# Patient Record
Sex: FEMALE | Race: BLACK OR AFRICAN AMERICAN | Employment: UNEMPLOYED | URBAN - METROPOLITAN AREA
[De-identification: names, ages, dates, MRNs, and addresses within clinical notes are randomized per-mention and may not be internally consistent; named-entity substitution may affect disease eponyms.]

---

## 2020-12-22 ENCOUNTER — HOSPITAL ENCOUNTER (OUTPATIENT)
Dept: PHYSICAL THERAPY | Age: 60
Setting detail: THERAPIES SERIES
Discharge: HOME | End: 2020-12-22
Payer: MEDICARE

## 2020-12-22 PROCEDURE — 97140 MANUAL THERAPY 1/> REGIONS: CPT

## 2020-12-22 PROCEDURE — 97110 THERAPEUTIC EXERCISES: CPT

## 2020-12-22 PROCEDURE — 97161 PT EVAL LOW COMPLEX 20 MIN: CPT

## 2020-12-22 NOTE — PLAN OF CARE
[address] [facility] [address] [place] [address]  Phone: [phone]   Fax:     [phone]                                                       Physical Therapy Certification    Dear Referring Practitioner: Dr. Vee Lester,    We had the pleasure of evaluating the following patient for physical therapy services at [facility]. A summary of our findings can be found in the initial assessment below. This includes our plan of care. If you have any questions or concerns regarding these findings, please do not hesitate to contact me at the office phone number checked above. Thank you for the referral.       Physician Signature:_______________________________Date:__________________  By signing above (or electronic signature), therapists plan is approved by physician          Patient: Yessica Fernandez   : 1960   MRN: [id number]  Referring Physician: Referring Practitioner: Dr. Vee Lester      Evaluation Date: 2020      Medical Diagnosis Information:  Diagnosis: M25.561 (ICD-10-CM) - Right knee pain                                             Insurance information: PT Insurance Information: [place] advantage     Precautions/ Contra-indications: non  Latex Allergy:  [x]NO      []YES  Preferred Language for Healthcare:   [x]English       []other:    C-SSRS Triggered by Intake questionnaire (Past 2 wk assessment ):   [x] No, Questionnaire did not trigger screening.   [] Yes, Patient intake triggered C-SSRS Screening      [] C-SSRS Screening completed  [] PCP notified via Epic     SUBJECTIVE: Patient is a 65 y/o female with a long hx of right knee pain for a fewyears with an increase over the past few months. She c/o constant aching pain in her right knee which increases with steps, prolonged walking, and hills. She is unemployed at this time.   She has had a cortisone shot which did not help at all. Davey Hamming She wakes from pain. She hopes to return to working out. She says it gives on her on occasion. Relevant Medical History:Additional Pertinent Hx: none noted  Functional Outcome Measure: LEFS = 18    Pain Scale: 7/10  Easing factors: rest  Provocative factors: walking     Type: [x]Constant   []Intermittent  []Radiating []Localized []other:         Occupation/School: not working    Living Status/Prior Level of Function: Independent with ADLs and IADLs,     OBJECTIVE:     Posture: ant tilt pelvis, poor ns      Palpation: - tight and tender in ant tib, post tib, gastroc, add, semi tendinosis, pes, decreased patellar mob chris med and inf        Gait: - ambualtes with decreased heel strike stance, and push. Mild limp. ROM LEFT RIGHT   HIP Flex  90   HIP Abd  25   HIP Ext  5   HIP IR  30   HIP ER  20   Knee ext  -10   Knee Flex  110   Ankle PF  25   Ankle DF  5   Ankle In     Ankle Ev     Strength  LEFT RIGHT   HIP Flexors  4   HIP Abductors  4   HIP Ext  4   Hip ER  4   Knee EXT (quad)  4-   Knee Flex (HS)  4-   Ankle DF  4   Ankle PF     Ankle Inv     Ankle EV          Circumference  Mid apex  7 cm prox      40       Reflexes/Sensation:    [x]Dermatomes/Myotomes intact    [x]Reflexes equal and normal bilaterally   []Other:    Joint mobility:    []Normal    [x]Hypo   []Hyper    Orthopedic Special Tests: - Mcmurrays and Apleys neg, patellar compression painful. Ant and post drawer- neg, Collateral- med laxity noted. [x] Patient history, allergies, meds reviewed. Medical chart reviewed. See intake form. Review Of Systems (ROS):  [x]Performed Review of systems (Integumentary, CardioPulmonary, Neurological) by intake and observation. Intake form has been scanned into medical record. Patient has been instructed to contact their primary care physician regarding ROS issues if not already being addressed at this time.       Co-morbidities/Complexities (which will affect course of rehabilitation):   []None           Arthritic conditions   []Rheumatoid arthritis (M05.9)  [x]Osteoarthritis (M19.91)   Cardiovascular conditions   []Hypertension (I10)  []Hyperlipidemia (E78.5)  []Angina pectoris (I20)  []Atherosclerosis (I70)  []CVA Musculoskeletal conditions   []Disc pathology   []Congenital spine pathologies   []Prior surgical intervention  []Osteoporosis (M81.8)  []Osteopenia (M85.8)   Endocrine conditions   []Hypothyroid (E03.9)  []Hyperthyroid Gastrointestinal conditions   []Constipation (K05.35)   Metabolic conditions   []Morbid obesity (E66.01)  []Diabetes type 1(E10.65) or 2 (E11.65)   []Neuropathy (G60.9)     Pulmonary conditions   []Asthma (J45)  []Coughing   []COPD (J44.9)   Psychological Disorders  []Anxiety (F41.9)  []Depression (F32.9)   []Other:   [x]Other:   fibromyalgia       Barriers to/and or personal factors that will affect rehab potential:              []Age  []Sex    [x]Smoker              []Motivation/Lack of Motivation                        [x]Co-Morbidities              []Cognitive Function, education/learning barriers              []Environmental, home barriers              []profession/work barriers  []past PT/medical experience  []other:  Justification:     Falls Risk Assessment (30 days):  [x] Falls Risk assessed and no intervention required.   [] Falls Risk assessed and Patient requires intervention due to being higher risk   TUG score (>12s at risk):     [] Falls education provided, including         ASSESSMENT:   Functional Impairments:     []Noted lumbar/proximal hip/LE hypomobility   [x]Decreased LE functional ROM   [x]Decreased core/proximal hip strength and neuromuscular control   [x]Decreased LE functional strength   [x]Reduced balance/proprioceptive control   []other:      Functional Activity Limitations (from functional questionnaire and intake)   []Reduced ability to tolerate prolonged functional positions   []Reduced ability or difficulty with changes of positions or transfers between positions   []Reduced ability to maintain good posture and demonstrate good body mechanics with sitting, bending, and lifting   [x]Reduced ability to sleep   [] Reduced ability or tolerance with driving and/or computer work   [x]Reduced ability to perform lifting, carrying tasks   [x]Reduced ability to squat   []Reduced ability to forward bend   [x]Reduced ability to ambulate prolonged functional periods/distances/surfaces   [x]Reduced ability to ascend/descend stairs   [x]Reduced ability to run, hop or jump   []other:     Participation Restrictions   [x]Reduced participation in self care activities   [x]Reduced participation in home management activities   [x]Reduced participation in work activities   [x]Reduced participation in social activities. [x]Reduced participation in sport activities. Classification :    [x]Signs/symptoms consistent with post-surgical status including decreased ROM, strength and function.    []Signs/symptoms consistent with joint sprain/strain   [x]Signs/symptoms consistent with patella-femoral syndrome   []Signs/symptoms consistent with knee OA/hip OA   [x]Signs/symptoms consistent with internal derangement of knee/Hip   [x]Signs/symptoms consistent with functional hip weakness/NMR control      [x]Signs/symptoms consistent with tendinitis/tendinosis    [x]signs/symptoms consistent with pathology which may benefit from Dry needling      []other:      Prognosis/Rehab Potential:      []Excellent   [x]Good    [x]Fair   []Poor    Tolerance of evaluation/treatment:    []Excellent   [x]Good    [x]Fair   []Poor    Physical Therapy Evaluation Complexity Justification  [x] A history of present problem with:  [] no personal factors and/or comorbidities that impact the plan of care;  []1-2 personal factors and/or comorbidities that impact the plan of care  []3 personal factors and/or comorbidities that impact the plan of care  [x] An examination of Functional Deficits. [] Progressing: [] Met: [] Not Met: [] Adjusted    Long Term Goals: To be achieved in:6weeks    [] Progressing: [] Met: [] Not Met: [] Adjusted  2. Patient will demonstrate increased AROM to 120-0 to allow for proper joint functioning as indicated by patients Functional Deficits. [] Progressing: [] Met: [] Not Met: [] Adjusted  3. Patient will demonstrate an increase in Strength to good proximal hip strength and control, within 5lb HHD in LE to allow for proper functional mobility as indicated by patients Functional Deficits. [] Progressing: [] Met: [] Not Met: [] Adjusted  4. Patient will return to 75%functional activities without increased symptoms or restriction. [] Progressing: [] Met: [] Not Met: [] Adjusted  5. (patient specific functional goal)    [] Progressing: [] Met: [] Not Met: [] Adjusted     Electronically signed by:  Janette Sharma PT      Note: If patient does not return for scheduled/recommended follow up visits, this note will serve as a discharge from care along with the most recent update on progress.

## 2020-12-22 NOTE — FLOWSHEET NOTE
[facility]  [address] [place]  Phone: [phone]   Fax:     [phone]      Physical Therapy Treatment Note/ Progress Report:     Date:  2020    Patient Name:  Hyacinth Valdovinos    :  1960  MRN: [id number]    Pertinent Medical History:Additional Pertinent Hx: none noted    Medical/Treatment Diagnosis Information:  · Diagnosis: M25.561 (ICD-10-CM) - Right knee pain  ·  decreased abilty to ambulate and function    Insurance/Certification information:  PT Insurance Information: [place] advantage  Physician Information:  Referring Practitioner: Dr. Edilia Madera of care signed (Y/N): faxed    Date of Patient follow up with Physician:      Progress Report: []  Yes  [x]  No     Date Range for reporting period:  Beginnin2020  Ending:      Progress report due (10 Rx/or 30 days whichever is less):     Recertification due (POC duration/ or 90 days whichever is less): 3/22/21     Visit # POC/Insurance Allowable Auth Needed   1 20 []Yes   []No     Latex Allergy:  [x]NO      []YES  Preferred Language for Healthcare:   [x]English       []Other:    Functional Scale:      Date assessed: at eval  Test: LEFS-18  Score:    Pain level:4-7/10     History of Injury:Patient is a 65 y/o female with a long hx of right knee pain for a fewyears with an increase over the past few months. She c/o constant aching pain in her right knee which increases with steps, prolonged walking, and hills. She is unemployed at this time. She has had a cortisone shot which did not help at all. Ronald Leahy She wakes from pain. She hopes to return to working out.   She says it gives on her on occasion    SUBJECTIVE:  Pt states, \" I want to walk and function like normal \"    OBJECTIVE:     ROM LEFT RIGHT   HIP Flex   90   HIP Abd   25   HIP Ext   5   HIP IR   30   HIP ER   20   Knee ext   -10   Knee Flex   110 Ankle PF   25   Ankle DF   5   Ankle In       Ankle Ev       Strength  LEFT RIGHT   HIP Flexors   4   HIP Abductors   4   HIP Ext   4   Hip ER   4   Knee EXT (quad)   4-   Knee Flex (HS)   4-   Ankle DF   4   Ankle PF       Ankle Inv       Ankle EV               Circumference  Mid apex  7 cm prox         40             RESTRICTIONS/PRECAUTIONS:     Exercises/Interventions:     Therapeutic Ex (76504)   Min: Reps/Resistance Notes/CUES   Nu step     Leg press     saq/laq     Sl abd     Prone le ext     incline     Med ahms stretch                              Manual Intervention (23368)  Min:     Knee mobs/PROM     Tib/Fem Mobs     Patella Mobs     Ankle mobs               NMR re-education (91313)  Min:  CUES NEEDED   wobble     sls                         Therapeutic Activity (67580)  Min:               Modalities  Min:     IFC with      CP after exercises     MH after exercises                                        HEP     gastroc stretch 60 x 2    Hams stretch 60 x 2    qs X 20    Prone flex X 30    slr X 30           Other Therapeutic Activities: Pt was educated on PT POC, Diagnosis, Prognosis, pathomechanics as well as frequency and duration of scheduling future physical therapy appointments. Time was also taken on this day to answer all patient questions and participation in PT. Reviewed appointment policy in detail with patient and patient verbalized understanding. Home Exercise Program: Patient was instructed in the following for HEP:     . Patient verbalized/demonstrated understanding and was issued written handout.   12/22/20  HEP  -clams x 30,       Therapeutic Exercise and NMR EXR  [] (97642) Provided verbal/tactile cueing for activities related to strengthening, flexibility, endurance, ROM for improvements in LE, proximal hip, and core control with self care, mobility, lifting, ambulation.  [] (78519) Provided verbal/tactile cueing for activities related to improving balance, coordination, kinesthetic sense, posture, motor skill, proprioception  to assist with LE, proximal hip, and core control in self care, mobility, lifting, ambulation and eccentric single leg control. NMR and Therapeutic Activities:    [] (61095 or 42244) Provided verbal/tactile cueing for activities related to improving balance, coordination, kinesthetic sense, posture, motor skill, proprioception and motor activation to allow for proper function of core, proximal hip and LE with self care and ADLs and functional mobility.   [] (43532) Gait Re-education- Provided training and instruction to the patient for proper LE, core and proximal hip recruitment and positioning and eccentric body weight control with ambulation re-education including up and down stairs     Home Exercise Program:    [x] (57086) Reviewed/Progressed HEP activities related to strengthening, flexibility, endurance, ROM of core, proximal hip and LE for functional self-care, mobility, lifting and ambulation/stair navigation   [] (70293)Reviewed/Progressed HEP activities related to improving balance, coordination, kinesthetic sense, posture, motor skill, proprioception of core, proximal hip and LE for self care, mobility, lifting, and ambulation/stair navigation      Manual Treatments:  PROM / STM / Oscillations-Mobs:  G-I, II, III, IV (PA's, Inf., Post.)  [] (37958) Provided manual therapy to mobilize LE, proximal hip and/or LS spine soft tissue/joints for the purpose of modulating pain, promoting relaxation,  increasing ROM, reducing/eliminating soft tissue swelling/inflammation/restriction, improving soft tissue extensibility and allowing for proper ROM for normal function with self care, mobility, lifting and ambulation.      If [facility] Please Indicate Time In/Out  CPT Code Time in Time out                         Charges:  Timed Code Treatment Minutes: 30   Total Treatment Minutes: 45      [x] EVAL (LOW) 06935 (typically 20 minutes face-to-face)  [] EVAL (MOD) 03378 (typically 30 minutes face-to-face)  [] EVAL (HIGH) 94904 (typically 45 minutes face-to-face)  [] RE-EVAL     [x] DP(22777) x   1  [] Dry needle 1 or 2 Muscles (04349)  [] NMR (75736) x     [] Dry needle 3+ Muscles (36573)  [x] Manual (13119) x 1    [] Ultrasound (27526) x  [] TA (44016) x     [] Mech Traction (71774)  [] ES(attended) (23196)     [] ES (un) (05968):   [] Vasopump (00214) [] Ionto (92388)   [] Other:    María Elena Cruz stated goal: \" I want to be able to walk and function like normal \"  []? Progressing: []? Met: []? Not Met: []? Adjusted     Therapist goals for Patient:   Short Term Goals: To be achieved in: 2 weeks  1. Independent in HEP and progression per patient tolerance, in order to prevent re-injury. []? Progressing: []? Met: []? Not Met: []? Adjusted  2. Patient will have a decrease in pain to facilitate improvement in movement, function, and ADLs as indicated by Functional Deficits. []? Progressing: []? Met: []? Not Met: []? Adjusted     Long Term Goals: To be achieved in:6weeks     []? Progressing: []? Met: []? Not Met: []? Adjusted  2. Patient will demonstrate increased AROM to 120-0 to allow for proper joint functioning as indicated by patients Functional Deficits. []? Progressing: []? Met: []? Not Met: []? Adjusted  3. Patient will demonstrate an increase in Strength to good proximal hip strength and control, within 5lb HHD in LE to allow for proper functional mobility as indicated by patients Functional Deficits. []? Progressing: []? Met: []? Not Met: []? Adjusted  4. Patient will return to 75%functional activities without increased symptoms or restriction. []? Progressing: []? Met: []? Not Met: []? Adjusted  5. (patient specific functional goal)    []? Progressing: []? Met: []? Not Met: []?  Adjusted     S:    ASSESSMENT:  See eval    Treatment/Activity Tolerance:  [x] Patient tolerated treatment well [] Patient limited by fatique  [] Patient limited by pain  [] Patient limited by other medical complications  [] Other:     Overall Progression Towards Functional goals/ Treatment Progress Update:  [] Patient is progressing as expected towards functional goals listed. [] Progression is slowed due to complexities/Impairments listed. [] Progression has been slowed due to co-morbidities. [x] Plan just implemented, too soon to assess goals progression <30days   [] Goals require adjustment due to lack of progress  [] Patient is not progressing as expected and requires additional follow up with physician  [] Other    Prognosis for POC: [x] Good [] Fair  [] Poor    Patient requires continued skilled intervention: [x] Yes  [] No        PLAN: LE arom, prom  strength, proprioception, gait, balance, functional activities. Mfr, joint mobs, mods as needed, hep. Progress as tolerated    [] Continue per plan of care [] Alter current plan (see comments)  [x] Plan of care initiated [] Hold pending MD visit [] Discharge    Electronically signed by: Alena Zaidi PT    Note: If patient does not return for scheduled/recommended follow up visits, this note will serve as a discharge from care along with the most recent update on progress.

## 2020-12-29 ENCOUNTER — HOSPITAL ENCOUNTER (OUTPATIENT)
Dept: PHYSICAL THERAPY | Age: 60
Setting detail: THERAPIES SERIES
Discharge: HOME | End: 2020-12-29
Payer: MEDICARE

## 2020-12-29 PROCEDURE — 97530 THERAPEUTIC ACTIVITIES: CPT

## 2020-12-29 PROCEDURE — 97110 THERAPEUTIC EXERCISES: CPT

## 2020-12-29 NOTE — FLOWSHEET NOTE
HIP Flexors   4   HIP Abductors   4   HIP Ext   4   Hip ER   4   Knee EXT (quad)   4-   Knee Flex (HS)   4-   Ankle DF   4   Ankle PF       Ankle Inv       Ankle EV               Circumference  Mid apex  7 cm prox         40             RESTRICTIONS/PRECAUTIONS:     Exercises/Interventions:     Therapeutic Ex (38965)   Min: Reps/Resistance Notes/CUES   Nu step X 5 min , level 3    Leg press 75# 2 x 20  45# 2 x 20 R    Saq/laq LAQ 2 x 20 7#    Sl abd     Prone le ext     incline 3 x 30\" R    Med ahms stretch 3 x 30\" R     SLR 2 x 20 R                        Manual Intervention (24320)  Min:     Knee mobs/PROM     Tib/Fem Mobs     Patella Mobs     Ankle mobs     K Tape Runner's knee to Right         NMR re-education (40143)  Min:  CUES NEEDED   wobble     sls                         Therapeutic Activity (78974)  Min:               Modalities  Min:     IFC with      CP after exercises     MH after exercises                                        HEP     gastroc stretch 60 x 2    Hams stretch 60 x 2    qs X 20    Prone flex X 30    slr X 30           Other Therapeutic Activities:   Pt was educated on PT POC, Diagnosis, Prognosis, pathomechanics as well as frequency and duration of scheduling future physical therapy appointments. Time was also taken on this day to answer all patient questions and participation in PT. Reviewed appointment policy in detail with patient and patient verbalized understanding. Home Exercise Program:   Patient was instructed in the following for HEP:     . Patient verbalized/demonstrated understanding and was issued written handout.   12/22/20  HEP  -clams x 30,       Therapeutic Exercise and NMR EXR  [] (87645) Provided verbal/tactile cueing for activities related to strengthening, flexibility, endurance, ROM for improvements in LE, proximal hip, and core control with self care, mobility, lifting, ambulation.  [] (08372) Provided verbal/tactile cueing for activities related to improving face-to-face)  [] EVAL (MOD) 42933 (typically 30 minutes face-to-face)  [] EVAL (HIGH) 14027 (typically 45 minutes face-to-face)  [] RE-EVAL     [x] JY(29242) x   2  [] Dry needle 1 or 2 Muscles (90285)  [] NMR (74926) x     [] Dry needle 3+ Muscles (97392)  [] Manual (65282) x 1    [] Ultrasound (42154) x  [x] TA (97600) x     [] Mech Traction (10935)  [] ES(attended) (65000)     [] ES (un) (45278):   [] Vasopump (14181) [] Ionto (01452)   [] Other:    Ene Paniagua stated goal: \" I want to be able to walk and function like normal \"  []? Progressing: []? Met: []? Not Met: []? Adjusted     Therapist goals for Patient:   Short Term Goals: To be achieved in: 2 weeks  1. Independent in HEP and progression per patient tolerance, in order to prevent re-injury. []? Progressing: []? Met: []? Not Met: []? Adjusted  2. Patient will have a decrease in pain to facilitate improvement in movement, function, and ADLs as indicated by Functional Deficits. []? Progressing: []? Met: []? Not Met: []? Adjusted     Long Term Goals: To be achieved in:6weeks     []? Progressing: []? Met: []? Not Met: []? Adjusted  2. Patient will demonstrate increased AROM to 120-0 to allow for proper joint functioning as indicated by patients Functional Deficits. []? Progressing: []? Met: []? Not Met: []? Adjusted  3. Patient will demonstrate an increase in Strength to good proximal hip strength and control, within 5lb HHD in LE to allow for proper functional mobility as indicated by patients Functional Deficits. []? Progressing: []? Met: []? Not Met: []? Adjusted  4. Patient will return to 75%functional activities without increased symptoms or restriction. []? Progressing: []? Met: []? Not Met: []? Adjusted  5. (patient specific functional goal)    []? Progressing: []? Met: []? Not Met: []?  Adjusted     S:    ASSESSMENT:  See eval    Treatment/Activity Tolerance:  [x] Patient tolerated treatment well [] Patient limited by omar  [] Patient limited by pain  [] Patient limited by other medical complications  [] Other:     Overall Progression Towards Functional goals/ Treatment Progress Update:  [] Patient is progressing as expected towards functional goals listed. [] Progression is slowed due to complexities/Impairments listed. [] Progression has been slowed due to co-morbidities. [x] Plan just implemented, too soon to assess goals progression <30days   [] Goals require adjustment due to lack of progress  [] Patient is not progressing as expected and requires additional follow up with physician  [] Other    Prognosis for POC: [x] Good [] Fair  [] Poor    Patient requires continued skilled intervention: [x] Yes  [] No        PLAN: LE arom, prom  strength, proprioception, gait, balance, functional activities. Mfr, joint mobs, mods as needed, hep. Progress as tolerated    [] Continue per plan of care [] Alter current plan (see comments)  [x] Plan of care initiated [] Hold pending MD visit [] Discharge    Electronically signed by: Aixa Barajas, PT    Note: If patient does not return for scheduled/recommended follow up visits, this note will serve as a discharge from care along with the most recent update on progress.

## 2020-12-31 ENCOUNTER — HOSPITAL ENCOUNTER (OUTPATIENT)
Dept: PHYSICAL THERAPY | Age: 60
Setting detail: THERAPIES SERIES
Discharge: HOME | End: 2020-12-31
Payer: MEDICARE

## 2020-12-31 PROCEDURE — 97140 MANUAL THERAPY 1/> REGIONS: CPT

## 2020-12-31 PROCEDURE — 97110 THERAPEUTIC EXERCISES: CPT

## 2020-12-31 NOTE — FLOWSHEET NOTE
[facility] - Outpatient Rehabilitation & Therapy  [address] [address] ([facility]. [place] [address]  Phone: [phone]   Fax:     [phone]      Physical Therapy Treatment Note/ Progress Report:     Date:  2020    Patient Name:  Arun Negrete    :  1960  MRN: [id number]    Pertinent Medical History:     Medical/Treatment Diagnosis Information:     ·  decreased abilty to ambulate and function    Insurance/Certification information:     Physician Information:     Plan of care signed (Y/N): faxed    Date of Patient follow up with Physician:      Progress Report: []  Yes  [x]  No     Date Range for reporting period:  Beginnin2020  Ending:      Progress report due (10 Rx/or 30 days whichever is less):     Recertification due (POC duration/ or 90 days whichever is less): 3/22/21     Visit # POC/Insurance Allowable Auth Needed   3 20 []Yes   []No     Latex Allergy:  [x]NO      []YES  Preferred Language for Healthcare:   [x]English       []Other:    Functional Scale:      Date assessed: at eval  Test: LEFS-18  Score:    Pain level: 5/10     History of Injury:Patient is a 65 y/o female with a long hx of right knee pain for a fewyears with an increase over the past few months. She c/o constant aching pain in her right knee which increases with steps, prolonged walking, and hills. She is unemployed at this time. She has had a cortisone shot which did not help at all. Meagan Bob She wakes from pain. She hopes to return to working out. She says it gives on her on occasion    SUBJECTIVE:    Pt states, \" I want to walk and function like normal \"  20: States today she is hurting more, yesterday was a better day 5/10 pain  20: Patient reports knee is not too bad today,just some swelling around the knee.     OBJECTIVE:     ROM LEFT RIGHT   HIP Flex   90   HIP Abd   25   HIP Ext   5   HIP IR   30   HIP ER   20   Knee ext   -10   Knee Flex   110   Ankle PF   25   Ankle DF   5   Ankle In       Ankle Ev       Strength  LEFT RIGHT   HIP Flexors   4   HIP Abductors   4   HIP Ext   4   Hip ER   4   Knee EXT (quad)   4-   Knee Flex (HS)   4-   Ankle DF   4   Ankle PF       Ankle Inv       Ankle EV               Circumference  Mid apex  7 cm prox         40             RESTRICTIONS/PRECAUTIONS:     Exercises/Interventions:     Therapeutic Ex (56548)   Min: Reps/Resistance Notes/CUES   Nu step X 5 min , level 3    Leg press 75# 2 x 20  45# 2 x 20 R    Saq/laq LAQ 2 x 20 7#    Sl abd     Prone le ext     incline 3 x 30\" R    Med ahms stretch 3 x 30\" R     SLR 2 x 20 R                        Manual Intervention (82148)  Min:     Knee mobs/PROM     Tib/Fem Mobs     Patella Mobs 5 min    Ankle mobs     K Tape Runner's knee to Right    STM Quad,TFL,gastroc x 10 min    NMR re-education (52546)  Min:  CUES NEEDED   wobble     sls                         Therapeutic Activity (69312)  Min:               Modalities  Min:     IFC with      CP after exercises     MH after exercises                                        HEP     gastroc stretch 60 x 2    Hams stretch 60 x 2    qs X 20    Prone flex X 30    slr X 30           Other Therapeutic Activities:   Pt was educated on PT POC, Diagnosis, Prognosis, pathomechanics as well as frequency and duration of scheduling future physical therapy appointments. Time was also taken on this day to answer all patient questions and participation in PT. Reviewed appointment policy in detail with patient and patient verbalized understanding. Home Exercise Program:   Patient was instructed in the following for HEP:     . Patient verbalized/demonstrated understanding and was issued written handout.   12/22/20  HEP  -clams x 30,       Therapeutic Exercise and NMR EXR  [] (29233) Provided verbal/tactile cueing for activities related to strengthening, flexibility, endurance, ROM for improvements in LE, proximal hip, and core control with self care, mobility, lifting, ambulation.  [] (51907) Provided verbal/tactile cueing for activities related to improving balance, coordination, kinesthetic sense, posture, motor skill, proprioception  to assist with LE, proximal hip, and core control in self care, mobility, lifting, ambulation and eccentric single leg control. NMR and Therapeutic Activities:    [] (09058 or 39590) Provided verbal/tactile cueing for activities related to improving balance, coordination, kinesthetic sense, posture, motor skill, proprioception and motor activation to allow for proper function of core, proximal hip and LE with self care and ADLs and functional mobility.   [] (34824) Gait Re-education- Provided training and instruction to the patient for proper LE, core and proximal hip recruitment and positioning and eccentric body weight control with ambulation re-education including up and down stairs     Home Exercise Program:    [x] (91315) Reviewed/Progressed HEP activities related to strengthening, flexibility, endurance, ROM of core, proximal hip and LE for functional self-care, mobility, lifting and ambulation/stair navigation   [] (05644)Reviewed/Progressed HEP activities related to improving balance, coordination, kinesthetic sense, posture, motor skill, proprioception of core, proximal hip and LE for self care, mobility, lifting, and ambulation/stair navigation      Manual Treatments:  PROM / STM / Oscillations-Mobs:  G-I, II, III, IV (PA's, Inf., Post.)  [] (28873) Provided manual therapy to mobilize LE, proximal hip and/or LS spine soft tissue/joints for the purpose of modulating pain, promoting relaxation,  increasing ROM, reducing/eliminating soft tissue swelling/inflammation/restriction, improving soft tissue extensibility and allowing for proper ROM for normal function with self care, mobility, lifting and ambulation.      If BWC Please Indicate Time In/Out  CPT Code Time in Time out See eval    Treatment/Activity Tolerance:  [x] Patient tolerated treatment well [] Patient limited by fatique  [] Patient limited by pain  [] Patient limited by other medical complications  [] Other:     Overall Progression Towards Functional goals/ Treatment Progress Update:  [] Patient is progressing as expected towards functional goals listed. [] Progression is slowed due to complexities/Impairments listed. [] Progression has been slowed due to co-morbidities. [x] Plan just implemented, too soon to assess goals progression <30days   [] Goals require adjustment due to lack of progress  [] Patient is not progressing as expected and requires additional follow up with physician  [] Other    Prognosis for POC: [x] Good [] Fair  [] Poor    Patient requires continued skilled intervention: [x] Yes  [] No        PLAN: LE arom, prom  strength, proprioception, gait, balance, functional activities. Mfr, joint mobs, mods as needed, hep. Progress as tolerated    [] Continue per plan of care [] Alter current plan (see comments)  [x] Plan of care initiated [] Hold pending MD visit [] Discharge    Electronically signed by: Matthew Maldonado PTA    Note: If patient does not return for scheduled/recommended follow up visits, this note will serve as a discharge from care along with the most recent update on progress.

## 2021-01-05 ENCOUNTER — HOSPITAL ENCOUNTER (OUTPATIENT)
Dept: PHYSICAL THERAPY | Age: 61
Setting detail: THERAPIES SERIES
Discharge: HOME | End: 2021-01-05
Payer: MEDICARE

## 2021-01-05 PROCEDURE — 97140 MANUAL THERAPY 1/> REGIONS: CPT

## 2021-01-05 PROCEDURE — 97110 THERAPEUTIC EXERCISES: CPT

## 2021-01-05 NOTE — FLOWSHEET NOTE
[facility] - Outpatient Rehabilitation & Therapy  [address]. [place]  Phone: [phone]   Fax:     [phone]      Physical Therapy Treatment Note/ Progress Report:     Date:  2021    Patient Name:  Juan Rodgers    :  1960  MRN: [id number]  Patient Name:  Juan Rodgers                        :  1960                   MRN: [id number]     Pertinent Medical History:Additional Pertinent Hx: none noted     Medical/Treatment Diagnosis Information:  · Diagnosis: M25.561 (ICD-10-CM) - Right knee pain  ·  decreased abilty to ambulate and function     Insurance/Certification information:  PT Insurance Information: [employer]  Physician Information:  Referring Practitioner: Dr. Gus Hazel of care signed (Y/N): faxed     Date of Patient follow up with Physician:      Progress Report:      []? Yes                        [x]? No       Date of Patient follow up with Physician:      Progress Report: []  Yes  [x]  No     Date Range for reporting period:  Beginnin2021  Ending:      Progress report due (10 Rx/or 30 days whichever is less):     Recertification due (POC duration/ or 90 days whichever is less): 3/22/21     Visit # POC/Insurance Allowable Auth Needed   4 20 []Yes   []No     Latex Allergy:  [x]NO      []YES  Preferred Language for Healthcare:   [x]English       []Other:    Functional Scale:      Date assessed: at eval  Test: LEFS-18  Score:    Pain level: 5/10     History of Injury:Patient is a 65 y/o female with a long hx of right knee pain for a fewyears with an increase over the past few months. She c/o constant aching pain in her right knee which increases with steps, prolonged walking, and hills. She is unemployed at this time. She has had a cortisone shot which did not help at all. Belinda Henderson She wakes from pain. She hopes to return to working out.   She says it gives on her on occasion    SUBJECTIVE:    Pt states, \" I want to walk and function like normal \"  12/29/20: States today she is hurting more, yesterday was a better day 5/10 pain  12/31/20: Patient reports knee is not too bad today,just some swelling around the knee. 1/5/20  Pt states, \" MRI says that I have a tear in my medial meniscus \" \" I see the md in 2 weeks to discuss surgery \"    OBJECTIVE:     ROM LEFT RIGHT   HIP Flex   90   HIP Abd   25   HIP Ext   5   HIP IR   30   HIP ER   20   Knee ext   -10   Knee Flex   110   Ankle PF   25   Ankle DF   5   Ankle In       Ankle Ev       Strength  LEFT RIGHT   HIP Flexors   4   HIP Abductors   4   HIP Ext   4   Hip ER   4   Knee EXT (quad)   4-   Knee Flex (HS)   4-   Ankle DF   4   Ankle PF       Ankle Inv       Ankle EV               Circumference  Mid apex  7 cm prox         40             RESTRICTIONS/PRECAUTIONS:     Exercises/Interventions:     Therapeutic Ex (16191)   Min: Reps/Resistance Notes/CUES   Nu step X 5 min , level 3    Leg press 75# 2 x 20  45# 2 x 20 R    Saq/laq     Sl abd X 30    Prone curls 3# x 30    Prone le ext X 30    incline 3 x 30\" R    Med hams stretch 3 x 30\" R     SLR 2 x 20 R    sls X 2 min                   Manual Intervention (06565)  Min:     Knee mobs/PROM     Tib/Fem Mobs     Patella Mobs 5 min    Ankle mobs     K Tape Runner's knee to Right    STM Quad,TFL,gastroc x 10 min    NMR re-education (23200)  Min:  CUES NEEDED   wobble     sls                         Therapeutic Activity (68243)  Min:               Modalities  Min:     IFC with      CP after exercises     MH after exercises                                        HEP     gastroc stretch 60 x 2    Hams stretch 60 x 2    qs X 20    Prone flex X 30    slr X 30           Other Therapeutic Activities:   Pt was educated on PT POC, Diagnosis, Prognosis, pathomechanics as well as frequency and duration of scheduling future physical therapy appointments.  Time was also taken on this day to answer STM / Oscillations-Mobs:  G-I, II, III, IV (PA's, Inf., Post.)  [] (32787) Provided manual therapy to mobilize LE, proximal hip and/or LS spine soft tissue/joints for the purpose of modulating pain, promoting relaxation,  increasing ROM, reducing/eliminating soft tissue swelling/inflammation/restriction, improving soft tissue extensibility and allowing for proper ROM for normal function with self care, mobility, lifting and ambulation. If BWC Please Indicate Time In/Out  CPT Code Time in Time out                         Charges:  Timed Code Treatment Minutes: 45   Total Treatment Minutes: 45      [] EVAL (LOW) 57614 (typically 20 minutes face-to-face)  [] EVAL (MOD) 50729 (typically 30 minutes face-to-face)  [] EVAL (HIGH) 23742 (typically 45 minutes face-to-face)  [] RE-EVAL     [x] RU(39238) x   2  [] Dry needle 1 or 2 Muscles (49807)  [] NMR (46437) x     [] Dry needle 3+ Muscles (93608)  [x] Manual (55011) x 1    [] Ultrasound (17520) x  [] TA (50011) x     [] Mech Traction (87874)  [] ES(attended) (27894)     [] ES (un) (02068):   [] Vasopump (05911) [] Ionto (28493)   [] Other:    Paul Delarosa stated goal: \" I want to be able to walk and function like normal \"  []? Progressing: []? Met: []? Not Met: []? Adjusted     Therapist goals for Patient:   Short Term Goals: To be achieved in: 2 weeks  1. Independent in HEP and progression per patient tolerance, in order to prevent re-injury. []? Progressing: []? Met: []? Not Met: []? Adjusted  2. Patient will have a decrease in pain to facilitate improvement in movement, function, and ADLs as indicated by Functional Deficits. []? Progressing: []? Met: []? Not Met: []? Adjusted     Long Term Goals: To be achieved in:6weeks     []? Progressing: []? Met: []? Not Met: []? Adjusted  2. Patient will demonstrate increased AROM to 120-0 to allow for proper joint functioning as indicated by patients Functional Deficits. []? Progressing: []? Met: []?  Not Met: []? Adjusted  3. Patient will demonstrate an increase in Strength to good proximal hip strength and control, within 5lb HHD in LE to allow for proper functional mobility as indicated by patients Functional Deficits. []? Progressing: []? Met: []? Not Met: []? Adjusted  4. Patient will return to 75%functional activities without increased symptoms or restriction. []? Progressing: []? Met: []? Not Met: []? Adjusted  5. (patient specific functional goal)    []? Progressing: []? Met: []? Not Met: []? Adjusted     S:    ASSESSMENT:  See eval    Treatment/Activity Tolerance:  [x] Patient tolerated treatment well [] Patient limited by fatique  [] Patient limited by pain  [] Patient limited by other medical complications  [] Other:     Overall Progression Towards Functional goals/ Treatment Progress Update:  [] Patient is progressing as expected towards functional goals listed. [] Progression is slowed due to complexities/Impairments listed. [] Progression has been slowed due to co-morbidities. [x] Plan just implemented, too soon to assess goals progression <30days   [] Goals require adjustment due to lack of progress  [] Patient is not progressing as expected and requires additional follow up with physician  [] Other    Prognosis for POC: [x] Good [] Fair  [] Poor    Patient requires continued skilled intervention: [x] Yes  [] No        PLAN: LE arom, prom  strength, proprioception, gait, balance, functional activities. Mfr, joint mobs, mods as needed, hep. Progress as tolerated    [] Continue per plan of care [] Alter current plan (see comments)  [x] Plan of care initiated [] Hold pending MD visit [] Discharge    Electronically signed by: Brent Kwong PT    Note: If patient does not return for scheduled/recommended follow up visits, this note will serve as a discharge from care along with the most recent update on progress.

## 2021-01-07 ENCOUNTER — HOSPITAL ENCOUNTER (OUTPATIENT)
Dept: PHYSICAL THERAPY | Age: 61
Setting detail: THERAPIES SERIES
Discharge: HOME | End: 2021-01-07
Payer: MEDICARE

## 2021-01-07 NOTE — FLOWSHEET NOTE
Physical Therapy  Cancellation/No-show Note  Patient Name:  Makeda Burr  :  1960   Date:  2021  Cancelled visits to date: 1  No-shows to date: 0    For today's appointment patient:  [x]  Cancelled  []  Rescheduled appointment  []  No-show     Reason given by patient:  [x]  Patient ill  []  Conflicting appointment  []  No transportation    []  Conflict with work  []  No reason given  []  Other:     Comments:      Electronically signed by:  Velma Edward PTA

## 2021-01-12 ENCOUNTER — HOSPITAL ENCOUNTER (OUTPATIENT)
Dept: PHYSICAL THERAPY | Age: 61
Setting detail: THERAPIES SERIES
Discharge: HOME | End: 2021-01-12
Payer: MEDICARE

## 2021-01-12 NOTE — FLOWSHEET NOTE
Physical Therapy  Cancellation/No-show Note  Patient Name:  Yaquelin Louis  :  1960   Date:  2021  Cancelled visits to date: 2   Family death  No-shows to date: 0    For today's appointment patient:  [x]  Cancelled  []  Rescheduled appointment  []  No-show     Reason given by patient:  [x]  Patient ill  []  Conflicting appointment  []  No transportation    []  Conflict with work  []  No reason given  []  Other:     Comments:      Electronically signed by:  Jessica Bonilla PT

## 2021-01-14 ENCOUNTER — APPOINTMENT (OUTPATIENT)
Dept: PHYSICAL THERAPY | Age: 61
End: 2021-01-14
Payer: MEDICARE

## 2021-01-19 ENCOUNTER — HOSPITAL ENCOUNTER (OUTPATIENT)
Dept: PHYSICAL THERAPY | Age: 61
Setting detail: THERAPIES SERIES
Discharge: HOME | End: 2021-01-19
Payer: MEDICARE

## 2021-01-19 PROCEDURE — 97110 THERAPEUTIC EXERCISES: CPT

## 2021-01-19 PROCEDURE — 97140 MANUAL THERAPY 1/> REGIONS: CPT

## 2021-01-19 NOTE — FLOWSHEET NOTE
[facility] - Outpatient Rehabilitation & Therapy  [address] [facility]. [place] [address]  Phone: [phone]   Fax:     [phone]      Physical Therapy Treatment Note/ Progress Report:     Date:  2021    Patient Name:  Sheng Taveras    :  1960  MRN: [id number]  Patient Name:  Sheng Taveras                        :  1960                   MRN: [id number]     Pertinent Medical History:Additional Pertinent Hx: none noted     Medical/Treatment Diagnosis Information:  · Diagnosis: M25.561 (ICD-10-CM) - Right knee pain  ·  decreased abilty to ambulate and function     Insurance/Certification information:  PT Insurance Information: [place] advantage  Physician Information:  Referring Practitioner: Dr. Angeline Johnson of care signed (Y/N): faxed     Date of Patient follow up with Physician:      Progress Report:      []? Yes                        [x]? No       Date of Patient follow up with Physician:      Progress Report: []  Yes  [x]  No     Date Range for reporting period:  Beginnin2021  Ending:      Progress report due (10 Rx/or 30 days whichever is less): 98    Recertification due (POC duration/ or 90 days whichever is less): 3/22/21     Visit # POC/Insurance Allowable Auth Needed   5 20 []Yes   []No     Latex Allergy:  [x]NO      []YES  Preferred Language for Healthcare:   [x]English       []Other:    Functional Scale:      Date assessed: at eval  Test: LEFS-18  Score:    Pain level: 5/10     History of Injury:Patient is a 65 y/o female with a long hx of right knee pain for a fewyears with an increase over the past few months. She c/o constant aching pain in her right knee which increases with steps, prolonged walking, and hills. She is unemployed at this time. She has had a cortisone shot which did not help at all. Yudith Yang She wakes from pain. She hopes to return to working out.   She says it gives on her on occasion    SUBJECTIVE:    Pt states, \" I want to walk and function like normal \"  12/29/20: States today she is hurting more, yesterday was a better day 5/10 pain  12/31/20: Patient reports knee is not too bad today,just some swelling around the knee.   1/5/20  Pt states, \" MRI says that I have a tear in my medial meniscus \" \" I see the md in 2 weeks to discuss surgery \"  1/19/21  Pt states, \" I haven't  done anything due to the fact that my son was murdered \"    OBJECTIVE:     ROM LEFT RIGHT   HIP Flex   90   HIP Abd   25   HIP Ext   5   HIP IR   30   HIP ER   20   Knee ext   -10   Knee Flex   110   Ankle PF   25   Ankle DF   5   Ankle In       Ankle Ev       Strength  LEFT RIGHT   HIP Flexors   4   HIP Abductors   4   HIP Ext   4   Hip ER   4   Knee EXT (quad)   4-   Knee Flex (HS)   4-   Ankle DF   4   Ankle PF       Ankle Inv       Ankle EV               Circumference  Mid apex  7 cm prox         40             RESTRICTIONS/PRECAUTIONS: [facility]    Exercises/Interventions:     Therapeutic Ex (11934)   Min: Reps/Resistance Notes/CUES   Nu step X 5 min , level 3    Leg press 75# 2 x 20  45# 2 x 20 R    Saq/laq     slr X 30    Sl abd X 30    Prone curls 3# x 30    Prone le ext X 30    incline 3 x 30\" R    Med hams stretch 3 x 30\" R     SLR 2 x 20 R    sls X 2 min    Step up               Manual Intervention (59759)  Min:     Knee mobs/PROM kineseo tape    Tib/Fem Mobs     Patella Mobs 5 min    Ankle mobs     K Tape Runner's knee to Right    STM Quad,TFL,gastroc x 10 min    NMR re-education (21208)  Min:  CUES NEEDED   wobble     sls                         Therapeutic Activity (63758)  Min:               Modalities  Min:     IFC with      CP after exercises     MH after exercises                                        HEP     gastroc stretch 60 x 2    Hams stretch 60 x 2    qs X 20    Prone flex X 30    slr X 30           Other Therapeutic Activities:   Pt was educated on PT POC, Diagnosis, Prognosis, pathomechanics as well as frequency and duration of scheduling future physical therapy appointments. Time was also taken on this day to answer all patient questions and participation in PT. Reviewed appointment policy in detail with patient and patient verbalized understanding. Home Exercise Program:   Patient was instructed in the following for HEP:     . Patient verbalized/demonstrated understanding and was issued written handout. 12/22/20  HEP  -clams x 30,       Therapeutic Exercise and NMR EXR  [] (62414) Provided verbal/tactile cueing for activities related to strengthening, flexibility, endurance, ROM for improvements in LE, proximal hip, and core control with self care, mobility, lifting, ambulation.  [] (22039) Provided verbal/tactile cueing for activities related to improving balance, coordination, kinesthetic sense, posture, motor skill, proprioception  to assist with LE, proximal hip, and core control in self care, mobility, lifting, ambulation and eccentric single leg control.      NMR and Therapeutic Activities:    [] (05034 or 00274) Provided verbal/tactile cueing for activities related to improving balance, coordination, kinesthetic sense, posture, motor skill, proprioception and motor activation to allow for proper function of core, proximal hip and LE with self care and ADLs and functional mobility.   [] (32509) Gait Re-education- Provided training and instruction to the patient for proper LE, core and proximal hip recruitment and positioning and eccentric body weight control with ambulation re-education including up and down stairs     Home Exercise Program:    [x] (07264) Reviewed/Progressed HEP activities related to strengthening, flexibility, endurance, ROM of core, proximal hip and LE for functional self-care, mobility, lifting and ambulation/stair navigation   [] (32149)Reviewed/Progressed HEP activities related to improving balance, coordination, kinesthetic sense, posture, motor skill, proprioception of core, proximal hip and LE for self care, mobility, lifting, and ambulation/stair navigation      Manual Treatments:  PROM / STM / Oscillations-Mobs:  G-I, II, III, IV (PA's, Inf., Post.)  [] (68772) Provided manual therapy to mobilize LE, proximal hip and/or LS spine soft tissue/joints for the purpose of modulating pain, promoting relaxation,  increasing ROM, reducing/eliminating soft tissue swelling/inflammation/restriction, improving soft tissue extensibility and allowing for proper ROM for normal function with self care, mobility, lifting and ambulation. If BWC Please Indicate Time In/Out  CPT Code Time in Time out                         Charges:  Timed Code Treatment Minutes: 45   Total Treatment Minutes: 45      [] EVAL (LOW) 76098 (typically 20 minutes face-to-face)  [] EVAL (MOD) 19143 (typically 30 minutes face-to-face)  [] EVAL (HIGH) 33145 (typically 45 minutes face-to-face)  [] RE-EVAL     [x] JZ(69783) x   2  [] Dry needle 1 or 2 Muscles (51179)  [] NMR (20957) x     [] Dry needle 3+ Muscles (10561)  [x] Manual (15712) x 1    [] Ultrasound (39314) x  [] TA (73403) x     [] Mech Traction (78679)  [] ES(attended) (87930)     [] ES (un) (88404):   [] Vasopump (39407) [] Ionto (35961)   [] Other:    Beto Molina stated goal: \" I want to be able to walk and function like normal \"  []? Progressing: []? Met: []? Not Met: []? Adjusted     Therapist goals for Patient:   Short Term Goals: To be achieved in: 2 weeks  1. Independent in HEP and progression per patient tolerance, in order to prevent re-injury. []? Progressing: []? Met: []? Not Met: []? Adjusted  2. Patient will have a decrease in pain to facilitate improvement in movement, function, and ADLs as indicated by Functional Deficits. []? Progressing: []? Met: []? Not Met: []? Adjusted     Long Term Goals: To be achieved in:6weeks     []? Progressing: []? Met: []? Not Met: []? Adjusted  2.  Patient will demonstrate increased AROM to 120-0 to allow for proper joint functioning as indicated by patients Functional Deficits. []? Progressing: []? Met: []? Not Met: []? Adjusted  3. Patient will demonstrate an increase in Strength to good proximal hip strength and control, within 5lb HHD in LE to allow for proper functional mobility as indicated by patients Functional Deficits. []? Progressing: []? Met: []? Not Met: []? Adjusted  4. Patient will return to 75%functional activities without increased symptoms or restriction. []? Progressing: []? Met: []? Not Met: []? Adjusted  5. (patient specific functional goal)    []? Progressing: []? Met: []? Not Met: []? Adjusted     S:    ASSESSMENT:  See eval    Treatment/Activity Tolerance:  [x] Patient tolerated treatment well [] Patient limited by fatique  [] Patient limited by pain  [] Patient limited by other medical complications  [] Other:     Overall Progression Towards Functional goals/ Treatment Progress Update:  [] Patient is progressing as expected towards functional goals listed. [] Progression is slowed due to complexities/Impairments listed. [] Progression has been slowed due to co-morbidities. [x] Plan just implemented, too soon to assess goals progression <30days   [] Goals require adjustment due to lack of progress  [] Patient is not progressing as expected and requires additional follow up with physician  [] Other    Prognosis for POC: [x] Good [] Fair  [] Poor    Patient requires continued skilled intervention: [x] Yes  [] No        PLAN: LE arom, prom  strength, proprioception, gait, balance, functional activities. Mfr, joint mobs, mods as needed, hep.  Progress as tolerated    [] Continue per plan of care [] Alter current plan (see comments)  [x] Plan of care initiated [] Hold pending MD visit [] Discharge    Electronically signed by: Nata Georges PT    Note: If patient does not return for scheduled/recommended follow up visits, this note will serve as a discharge from care along with the most recent update on progress.

## 2021-01-21 ENCOUNTER — HOSPITAL ENCOUNTER (OUTPATIENT)
Dept: PHYSICAL THERAPY | Age: 61
Setting detail: THERAPIES SERIES
Discharge: HOME | End: 2021-01-21
Payer: MEDICARE

## 2021-01-21 PROCEDURE — 97110 THERAPEUTIC EXERCISES: CPT

## 2021-01-21 PROCEDURE — 97140 MANUAL THERAPY 1/> REGIONS: CPT

## 2021-01-21 NOTE — FLOWSHEET NOTE
[facility] - Outpatient Rehabilitation & Therapy  [address]. [place]  Phone: [phone]   Fax:     [phone]      Physical Therapy Treatment Note/ Progress Report:     Date:  2021    Patient Name:  Josep Kaplan    :  1960  MRN: [id number]  Patient Name:  Josep Kaplan                        :  1960                   MRN: [id number]     Pertinent Medical History:Additional Pertinent Hx: none noted     Medical/Treatment Diagnosis Information:  · Diagnosis: M25.561 (ICD-10-CM) - Right knee pain  ·  decreased abilty to ambulate and function     Insurance/Certification information:  PT Insurance Information: [place] advantage  Physician Information:  Referring Practitioner: Dr. Tenisha Holt of care signed (Y/N): faxed     Date of Patient follow up with Physician:      Progress Report:      []? Yes                        [x]? No       Date of Patient follow up with Physician:      Progress Report: []  Yes  [x]  No     Date Range for reporting period:  Beginnin2021  Ending:      Progress report due (10 Rx/or 30 days whichever is less): 85    Recertification due (POC duration/ or 90 days whichever is less): 3/22/21     Visit # POC/Insurance Allowable Auth Needed   6 20 []Yes   []No     Latex Allergy:  [x]NO      []YES  Preferred Language for Healthcare:   [x]English       []Other:    Functional Scale:      Date assessed: at eval  Test: LEFS-18  Score:    Pain level: 5/10     History of Injury:Patient is a 65 y/o female with a long hx of right knee pain for a fewyears with an increase over the past few months. She c/o constant aching pain in her right knee which increases with steps, prolonged walking, and hills. She is unemployed at this time. She has had a cortisone shot which did not help at all. Tona Dues She wakes from pain. She hopes to return to working out.   She says it gives on her on occasion    SUBJECTIVE:    Pt states, \" I want to walk and function like normal \"  12/29/20: States today she is hurting more, yesterday was a better day 5/10 pain  12/31/20: Patient reports knee is not too bad today,just some swelling around the knee.   1/5/20  Pt states, \" MRI says that I have a tear in my medial meniscus \" \" I see the md in 2 weeks to discuss surgery \"  1/19/21  Pt states, \" I haven't  done anything due to the fact that my son was killed a few weeks ago \" \"  1/21/21 Pt states, \" Knee is a little better then it was yesterday \"    OBJECTIVE:     ROM LEFT RIGHT   HIP Flex   90   HIP Abd   25   HIP Ext   5   HIP IR   30   HIP ER   20   Knee ext   -10   Knee Flex   110   Ankle PF   25   Ankle DF   5   Ankle In       Ankle Ev       Strength  LEFT RIGHT   HIP Flexors   4   HIP Abductors   4   HIP Ext   4   Hip ER   4   Knee EXT (quad)   4-   Knee Flex (HS)   4-   Ankle DF   4   Ankle PF       Ankle Inv       Ankle EV               Circumference  Mid apex  7 cm prox         40             RESTRICTIONS/PRECAUTIONS: [facility] cycst    Exercises/Interventions:     Therapeutic Ex (31731)   Min: Reps/Resistance Notes/CUES   Nu step X 5 min , level 3    Leg press 75# 2 x 20  45# 2 x 20 R    Saq/laq              Prone le ext X 30    incline 3 x 30\" R    Med hams stretch 3 x 30\" R     SLR    sls    Step up     crossover 60 x 2    piriformis 60 x 2    Manual Intervention (49485)  Min:     Knee mobs/PROM kineseo tape    Tib/Fem Mobs     Patella Mobs 5 min    Ankle mobs     K Tape Runner's knee to Right    STM Quad,TFL,gastroc, gluts, piriformis, concentrated on itb, vastus lateralis, biceps formalis x 30 min    NMR re-education (09782)  Min:  CUES NEEDED   wobble     sls                         Therapeutic Activity (74906)  Min:               Modalities  Min:     IFC with      CP after exercises     MH after exercises                                        HEP     gastroc stretch 60 x 2    Hams stretch 60 x 2    qs X 20    Prone flex X 30    slr X 30           Other Therapeutic Activities:   Pt was educated on PT POC, Diagnosis, Prognosis, pathomechanics as well as frequency and duration of scheduling future physical therapy appointments. Time was also taken on this day to answer all patient questions and participation in PT. Reviewed appointment policy in detail with patient and patient verbalized understanding. Home Exercise Program:   Patient was instructed in the following for HEP:     . Patient verbalized/demonstrated understanding and was issued written handout. 12/22/20  HEP  -clams x 30,       Therapeutic Exercise and NMR EXR  [] (60064) Provided verbal/tactile cueing for activities related to strengthening, flexibility, endurance, ROM for improvements in LE, proximal hip, and core control with self care, mobility, lifting, ambulation.  [] (85883) Provided verbal/tactile cueing for activities related to improving balance, coordination, kinesthetic sense, posture, motor skill, proprioception  to assist with LE, proximal hip, and core control in self care, mobility, lifting, ambulation and eccentric single leg control.      NMR and Therapeutic Activities:    [] (61937 or 14046) Provided verbal/tactile cueing for activities related to improving balance, coordination, kinesthetic sense, posture, motor skill, proprioception and motor activation to allow for proper function of core, proximal hip and LE with self care and ADLs and functional mobility.   [] (78294) Gait Re-education- Provided training and instruction to the patient for proper LE, core and proximal hip recruitment and positioning and eccentric body weight control with ambulation re-education including up and down stairs     Home Exercise Program:    [x] (76521) Reviewed/Progressed HEP activities related to strengthening, flexibility, endurance, ROM of core, proximal hip and LE for functional self-care, mobility, lifting and ambulation/stair navigation   [] (03830)Reviewed/Progressed HEP activities related to improving balance, coordination, kinesthetic sense, posture, motor skill, proprioception of core, proximal hip and LE for self care, mobility, lifting, and ambulation/stair navigation      Manual Treatments:  PROM / STM / Oscillations-Mobs:  G-I, II, III, IV (PA's, Inf., Post.)  [] (75758) Provided manual therapy to mobilize LE, proximal hip and/or LS spine soft tissue/joints for the purpose of modulating pain, promoting relaxation,  increasing ROM, reducing/eliminating soft tissue swelling/inflammation/restriction, improving soft tissue extensibility and allowing for proper ROM for normal function with self care, mobility, lifting and ambulation. If BWC Please Indicate Time In/Out  CPT Code Time in Time out                         Charges:  Timed Code Treatment Minutes: 45   Total Treatment Minutes: 45      [] EVAL (LOW) 98257 (typically 20 minutes face-to-face)  [] EVAL (MOD) 79341 (typically 30 minutes face-to-face)  [] EVAL (HIGH) 42578 (typically 45 minutes face-to-face)  [] RE-EVAL     [x] KK(14125) x   2  [] Dry needle 1 or 2 Muscles (93712)  [] NMR (85259) x     [] Dry needle 3+ Muscles (86415)  [x] Manual (61299) x 1    [] Ultrasound (33579) x  [] TA (42579) x     [] Mech Traction (45064)  [] ES(attended) (07002)     [] ES (un) (75665):   [] Vasopump (96357) [] Ionto (83161)   [] Other:    Spike Betts stated goal: \" I want to be able to walk and function like normal \"  []? Progressing: []? Met: []? Not Met: []? Adjusted     Therapist goals for Patient:   Short Term Goals: To be achieved in: 2 weeks  1. Independent in HEP and progression per patient tolerance, in order to prevent re-injury. []? Progressing: []? Met: []? Not Met: []? Adjusted  2. Patient will have a decrease in pain to facilitate improvement in movement, function, and ADLs as indicated by Functional Deficits. []? Progressing: []? Met: []? Not Met: []? Adjusted     Long Term Goals:  To be achieved in:6weeks     []? Progressing: []? Met: []? Not Met: []? Adjusted  2. Patient will demonstrate increased AROM to 120-0 to allow for proper joint functioning as indicated by patients Functional Deficits. []? Progressing: []? Met: []? Not Met: []? Adjusted  3. Patient will demonstrate an increase in Strength to good proximal hip strength and control, within 5lb HHD in LE to allow for proper functional mobility as indicated by patients Functional Deficits. []? Progressing: []? Met: []? Not Met: []? Adjusted  4. Patient will return to 75%functional activities without increased symptoms or restriction. []? Progressing: []? Met: []? Not Met: []? Adjusted  5. (patient specific functional goal)    []? Progressing: []? Met: []? Not Met: []? Adjusted     S:    ASSESSMENT:  See eval    Treatment/Activity Tolerance:  [x] Patient tolerated treatment well [] Patient limited by fatique  [] Patient limited by pain  [] Patient limited by other medical complications  [] Other:     Overall Progression Towards Functional goals/ Treatment Progress Update:  [] Patient is progressing as expected towards functional goals listed. [] Progression is slowed due to complexities/Impairments listed. [] Progression has been slowed due to co-morbidities. [x] Plan just implemented, too soon to assess goals progression <30days   [] Goals require adjustment due to lack of progress  [] Patient is not progressing as expected and requires additional follow up with physician  [] Other    Prognosis for POC: [x] Good [] Fair  [] Poor    Patient requires continued skilled intervention: [x] Yes  [] No        PLAN: LE arom, prom  strength, proprioception, gait, balance, functional activities. Mfr, joint mobs, mods as needed, hep.  Progress as tolerated, Will consider dry needling if ok with md  Continue to work on lateral muscles and hams and well as strength    [] Continue per plan of care [] Alter current plan (see comments)  [x] Plan of care initiated [] Hold pending MD visit [] Discharge    Electronically signed by: Everardo Dunn PT    Note: If patient does not return for scheduled/recommended follow up visits, this note will serve as a discharge from care along with the most recent update on progress.

## 2021-01-26 ENCOUNTER — HOSPITAL ENCOUNTER (OUTPATIENT)
Dept: PHYSICAL THERAPY | Age: 61
Setting detail: THERAPIES SERIES
Discharge: HOME | End: 2021-01-26
Payer: MEDICARE

## 2021-01-26 PROCEDURE — 97110 THERAPEUTIC EXERCISES: CPT

## 2021-01-26 PROCEDURE — 97035 APP MDLTY 1+ULTRASOUND EA 15: CPT

## 2021-01-26 PROCEDURE — 97140 MANUAL THERAPY 1/> REGIONS: CPT

## 2021-01-26 NOTE — FLOWSHEET NOTE
[facility] - Outpatient Rehabilitation & Therapy  [address]. [place]  Phone: [phone]   Fax:     [phone]      Physical Therapy Treatment Note/ Progress Report:     Date:  2021    Patient Name:  Rodrigo Doll    :  1960  MRN: [id number]  Patient Name:  Rodrigo Doll                        :  1960                   MRN: [id number]     Pertinent Medical History:Additional Pertinent Hx: none noted     Medical/Treatment Diagnosis Information:  · Diagnosis: M25.561 (ICD-10-CM) - Right knee pain  ·  decreased abilty to ambulate and function     Insurance/Certification information:  PT Insurance Information: [employer] advantage  Physician Information:  Referring Practitioner: Dr. Braulio Lawson of care signed (Y/N): faxed     Date of Patient follow up with Physician:      Progress Report:      []? Yes                        [x]? No       Date of Patient follow up with Physician:      Progress Report: []  Yes  [x]  No     Date Range for reporting period:  Beginnin2021  Ending:      Progress report due (10 Rx/or 30 days whichever is less):     Recertification due (POC duration/ or 90 days whichever is less): 3/22/21     Visit # POC/Insurance Allowable Auth Needed   7 20 []Yes   []No     Latex Allergy:  [x]NO      []YES  Preferred Language for Healthcare:   [x]English       []Other:    Functional Scale:      Date assessed: at eval  Test: LEFS-18  Score:    Pain level: 5/10     History of Injury:Patient is a 65 y/o female with a long hx of right knee pain for a fewyears with an increase over the past few months. She c/o constant aching pain in her right knee which increases with steps, prolonged walking, and hills. She is unemployed at this time. She has had a cortisone shot which did not help at all. [place] She wakes from pain. She hopes to return to working out.   She says it gives on her on occasion    SUBJECTIVE:    Pt states, \" I want to walk and function like normal \"  12/29/20: States today she is hurting more, yesterday was a better day 5/10 pain  12/31/20: Patient reports knee is not too bad today,just some swelling around the knee.   1/5/20  Pt states, \" MRI says that I have a tear in my medial meniscus \" \" I see the md in 2 weeks to discuss surgery \"  1/19/21  Pt states, \" I haven't  done anything due to the fact that my son was killed a few weeks ago \" \"  1/21/21 Pt states, \" Knee is a little better then it was yesterday \"  1/26/21  Pt states, \" did better after last rx for a day or 2, sore from  the weather today \"    OBJECTIVE:     ROM LEFT RIGHT   HIP Flex   90   HIP Abd   25   HIP Ext   5   HIP IR   30   HIP ER   20   Knee ext   -10   Knee Flex   110   Ankle PF   25   Ankle DF   5   Ankle In       Ankle Ev       Strength  LEFT RIGHT   HIP Flexors   4   HIP Abductors   4   HIP Ext   4   Hip ER   4   Knee EXT (quad)   4-   Knee Flex (HS)   4-   Ankle DF   4   Ankle PF       Ankle Inv       Ankle EV               Circumference  Mid apex  7 cm prox         40             RESTRICTIONS/PRECAUTIONS: Bakers cycst    Exercises/Interventions:     Therapeutic Ex (75447)   Min: Reps/Resistance Notes/CUES   Nu step X 5 min , level 3    Leg press 75# 2 x 20  45# 2 x 20 R    Saq/laq           Prone curls 5# x 30    Prone le ext X 30    incline 3 x 30\" R    Med hams stretch 3 x 30\" R     SLR    sls X 2 min   Step up 4\" x 30 tap and ecc    crossover 60 x 2    piriformis 60 x 2    Manual Intervention (63228)  Min:     Knee mobs/PROM kineseo tape    Tib/Fem Mobs     Patella Mobs 5 min    Ankle mobs     K Tape Runner's knee to Right    STM Quad,TFL,gastroc, gluts, piriformis, concentrated on itb, vastus lateralis, biceps formalis x 30 min    NMR re-education (65037)  Min:  CUES NEEDED   wobble X 2 min ea    sls X 2 min                        Therapeutic Activity (92534)  Min:               Modalities  Min: IFC with  Us1.5w/cm2 50% to medial knee x 8 min    CP after exercises     MH after exercises                                        HEP     gastroc stretch 60 x 2    Hams stretch 60 x 2    qs X 20    Prone flex X 30    slr X 30           Other Therapeutic Activities:   Pt was educated on PT POC, Diagnosis, Prognosis, pathomechanics as well as frequency and duration of scheduling future physical therapy appointments. Time was also taken on this day to answer all patient questions and participation in PT. Reviewed appointment policy in detail with patient and patient verbalized understanding. Home Exercise Program:   Patient was instructed in the following for HEP:     . Patient verbalized/demonstrated understanding and was issued written handout. 12/22/20  HEP  -clams x 30,       Therapeutic Exercise and NMR EXR  [] (32001) Provided verbal/tactile cueing for activities related to strengthening, flexibility, endurance, ROM for improvements in LE, proximal hip, and core control with self care, mobility, lifting, ambulation.  [] (40951) Provided verbal/tactile cueing for activities related to improving balance, coordination, kinesthetic sense, posture, motor skill, proprioception  to assist with LE, proximal hip, and core control in self care, mobility, lifting, ambulation and eccentric single leg control.      NMR and Therapeutic Activities:    [] (98497 or 12449) Provided verbal/tactile cueing for activities related to improving balance, coordination, kinesthetic sense, posture, motor skill, proprioception and motor activation to allow for proper function of core, proximal hip and LE with self care and ADLs and functional mobility.   [] (43110) Gait Re-education- Provided training and instruction to the patient for proper LE, core and proximal hip recruitment and positioning and eccentric body weight control with ambulation re-education including up and down stairs     Home Exercise Program:    [x] (86283) Reviewed/Progressed HEP activities related to strengthening, flexibility, endurance, ROM of core, proximal hip and LE for functional self-care, mobility, lifting and ambulation/stair navigation   [] (42079)Reviewed/Progressed HEP activities related to improving balance, coordination, kinesthetic sense, posture, motor skill, proprioception of core, proximal hip and LE for self care, mobility, lifting, and ambulation/stair navigation      Manual Treatments:  PROM / STM / Oscillations-Mobs:  G-I, II, III, IV (PA's, Inf., Post.)  [] (83054) Provided manual therapy to mobilize LE, proximal hip and/or LS spine soft tissue/joints for the purpose of modulating pain, promoting relaxation,  increasing ROM, reducing/eliminating soft tissue swelling/inflammation/restriction, improving soft tissue extensibility and allowing for proper ROM for normal function with self care, mobility, lifting and ambulation. If BWC Please Indicate Time In/Out  CPT Code Time in Time out                         Charges:  Timed Code Treatment Minutes: 52   Total Treatment Minutes: 55      [] EVAL (LOW) 82046 (typically 20 minutes face-to-face)  [] EVAL (MOD) 29584 (typically 30 minutes face-to-face)  [] EVAL (HIGH) 65482 (typically 45 minutes face-to-face)  [] RE-EVAL     [x] GD(45544) x   2  [] Dry needle 1 or 2 Muscles (34064)  [] NMR (45236) x     [] Dry needle 3+ Muscles (60010)  [x] Manual (30797) x 1    [x] Ultrasound (14227) x1  [] TA (16311) x     [] Mech Traction (79191)  [] ES(attended) (25091)     [] ES (un) (96356):   [] Vasopump (08715) [] Ionto (58439)   [] Other:    Shelton Fothergill stated goal: \" I want to be able to walk and function like normal \"  []? Progressing: []? Met: []? Not Met: []? Adjusted     Therapist goals for Patient:   Short Term Goals: To be achieved in: 2 weeks  1. Independent in HEP and progression per patient tolerance, in order to prevent re-injury. []? Progressing: []? Met: []? Not Met: []? Adjusted  2.  Patient will have a decrease in pain to facilitate improvement in movement, function, and ADLs as indicated by Functional Deficits. []? Progressing: []? Met: []? Not Met: []? Adjusted     Long Term Goals: To be achieved in:6weeks     []? Progressing: []? Met: []? Not Met: []? Adjusted  2. Patient will demonstrate increased AROM to 120-0 to allow for proper joint functioning as indicated by patients Functional Deficits. []? Progressing: []? Met: []? Not Met: []? Adjusted  3. Patient will demonstrate an increase in Strength to good proximal hip strength and control, within 5lb HHD in LE to allow for proper functional mobility as indicated by patients Functional Deficits. []? Progressing: []? Met: []? Not Met: []? Adjusted  4. Patient will return to 75%functional activities without increased symptoms or restriction. []? Progressing: []? Met: []? Not Met: []? Adjusted  5. (patient specific functional goal)    []? Progressing: []? Met: []? Not Met: []? Adjusted     S:    ASSESSMENT:  See eval    Treatment/Activity Tolerance:  [x] Patient tolerated treatment well [] Patient limited by fatique  [] Patient limited by pain  [] Patient limited by other medical complications  [] Other:     Overall Progression Towards Functional goals/ Treatment Progress Update:  [] Patient is progressing as expected towards functional goals listed. [] Progression is slowed due to complexities/Impairments listed. [] Progression has been slowed due to co-morbidities. [x] Plan just implemented, too soon to assess goals progression <30days   [] Goals require adjustment due to lack of progress  [] Patient is not progressing as expected and requires additional follow up with physician  [] Other    Prognosis for POC: [x] Good [] Fair  [] Poor    Patient requires continued skilled intervention: [x] Yes  [] No        PLAN: LE arom, prom  strength, proprioception, gait, balance, functional activities. Mfr, joint mobs, mods as needed, hep.  Progress as tolerated, Will consider dry needling if ok with md  Continue to work on lateral muscles and hams and well as strength    [] Continue per plan of care [] Alter current plan (see comments)  [x] Plan of care initiated [] Hold pending MD visit [] Discharge    Electronically signed by: Amilcar Puri PT    Note: If patient does not return for scheduled/recommended follow up visits, this note will serve as a discharge from care along with the most recent update on progress.

## 2021-01-28 ENCOUNTER — HOSPITAL ENCOUNTER (OUTPATIENT)
Dept: PHYSICAL THERAPY | Age: 61
Setting detail: THERAPIES SERIES
End: 2021-01-28
Payer: MEDICARE

## 2021-02-03 ENCOUNTER — HOSPITAL ENCOUNTER (OUTPATIENT)
Dept: PHYSICAL THERAPY | Age: 61
Setting detail: THERAPIES SERIES
Discharge: HOME | End: 2021-02-03
Payer: MEDICARE

## 2021-02-03 PROCEDURE — 97110 THERAPEUTIC EXERCISES: CPT

## 2021-02-03 PROCEDURE — 97112 NEUROMUSCULAR REEDUCATION: CPT

## 2021-02-03 PROCEDURE — 97140 MANUAL THERAPY 1/> REGIONS: CPT

## 2021-02-03 NOTE — PLAN OF CARE
Outpatient Physical Therapy  [] [facility]    Phone: [phone]   Fax: [phone]   [x] [facility]  Phone: [phone]              Fax: [phone]  [] [facility]   Phone: [phone]   Fax: [phone]     To:   Dr. Alireza Etienne     Patient: Jai Hearn   : 1960   MRN: [id number]  Evaluation Date: 2/3/2021      Diagnosis Information:  ·   Diagnosis: M25.561 (ICD-10-CM) - Right knee pain  ·  decreased abilty to ambulate and function  ·     ·       Physical Therapy Certification/Re-Certification Form  Dear Dr. Alireza Etienne,  I was thinking about trying dry needling with Ira Almeida if you are ok with this. Thanks, Evelyn Finch PT  The following patient has been evaluated for physical therapy services and for therapy to continue, Medicare requires monthly physician review of the treatment plan. Please review the attached evaluation and/or summary of the patient's plan of care, and verify that you agree therapy should continue by signing the attached document and sending it back to our office. Plan of Care/Treatment to date:  [x] Therapeutic Exercise    [] Modalities:  [x] Therapeutic Activity     [] Ultrasound  [] Electrical Stimulation  [x] Gait Training      [] Cervical Traction [] Lumbar Traction  [x] Neuromuscular Re-education    [x] Cold/hotpack [] Iontophoresis   [x] Instruction in HEP     Other:  [x] Manual Therapy      [x]     Dry needling        [] Aquatic Therapy      []           ? Frequency/Duration:  # Days per week: [] 1 day # Weeks: [] 1 week [] 5 weeks     [x] 2 days? [] 2 weeks [] 6 weeks     [] 3 days   [] 3 weeks [] 7 weeks     [] 4 days   [] 4 weeks [x] 8 weeks    Rehab Potential: [] Excellent [x] Good [] Fair  [] Poor       Electronically signed by:  Margi West PT      If you have any questions or concerns, please don't hesitate to call.   Thank you for your referral.      Physician Signature:________________________________Date:_________________  By signing above, therapists plan is approved by physician

## 2021-02-03 NOTE — FLOWSHEET NOTE
[facility] - Outpatient Rehabilitation & Therapy  [address] [address] [facility] [place] [place] [place] [address]  Phone: [phone]   Fax:     [phone]      Physical Therapy Treatment Note/ Progress Report:     Date:  2/3/2021    Patient Name:  Maylin Machado    :  1960  MRN: [id number]  Patient Name:  Maylin Machado                        :  1960                   MRN: [id number]     Pertinent Medical History:Additional Pertinent Hx: none noted     Medical/Treatment Diagnosis Information:  · Diagnosis: M25.561 (ICD-10-CM) - Right knee pain  ·  decreased abilty to ambulate and function     Insurance/Certification information:  PT Insurance Information: [employer] advantage  Physician Information:  Referring Practitioner: Dr. Jasper Horn of care signed (Y/N): faxed     Date of Patient follow up with Physician:      Progress Report:      []? Yes                        [x]? No       Date of Patient follow up with Physician:      Progress Report: []  Yes  [x]  No     Date Range for reporting period:  Beginnin/3/2021  Ending:      Progress report due (10 Rx/or 30 days whichever is less):     Recertification due (POC duration/ or 90 days whichever is less): 3/22/21     Visit # POC/Insurance Allowable Auth Needed    20 []Yes   []No     Latex Allergy:  [x]NO      []YES  Preferred Language for Healthcare:   [x]English       []Other:    Functional Scale:      Date assessed: at eval  Test: LEFS-18  Score:    Pain level:2/10     History of Injury:Patient is a 63 y/o female with a long hx of right knee pain for a fewyears with an increase over the past few months. She c/o constant aching pain in her right knee which increases with steps, prolonged walking, and hills. She is unemployed at this time. She has had a cortisone shot which did not help at all. [place] She wakes from pain. She hopes to return to working out.   She says it gives on her on occasion    SUBJECTIVE:    Pt states, \" I want to walk and function like normal \"  12/29/20: States today she is hurting more, yesterday was a better day 5/10 pain  12/31/20: Patient reports knee is not too bad today,just some swelling around the knee.   1/5/20  Pt states, \" MRI says that I have a tear in my medial meniscus \" \" I see the md in 2 weeks to discuss surgery \"  1/19/21  Pt states, \" I haven't  done anything due to the fact that my son was killed a few weeks ago \" \"  1/21/21 Pt states, \" Knee is a little better then it was yesterday \"  1/26/21  Pt states, \" did better after last rx for a day or 2, sore from  the weather today \"  2/3/21  Pt states, \" knee feeling better \" \" Able to go up and down the steps better \"    OBJECTIVE:     ROM LEFT RIGHT   HIP Flex   90   HIP Abd   25   HIP Ext   5   HIP IR   30   HIP ER   20   Knee ext   -10   Knee Flex   110   Ankle PF   25   Ankle DF   5   Ankle In       Ankle Ev       Strength  LEFT RIGHT   HIP Flexors   4   HIP Abductors   4   HIP Ext   4   Hip ER   4   Knee EXT (quad)   4-   Knee Flex (HS)   4-   Ankle DF   4   Ankle PF       Ankle Inv       Ankle EV               Circumference  Mid apex  7 cm prox         40             RESTRICTIONS/PRECAUTIONS: Bakers cycst    Exercises/Interventions:     Therapeutic Ex (60564)   Min: Reps/Resistance Notes/CUES   Nu step X 5 min , level 3    Leg press 90# 2 x 20  45# 2 x 20 R    Saq/laq           Prone curls 5# x 30    Prone le ext X 30    incline 3 x 30\" R    Med hams stretch 3 x 30\" R     SLR             Hip flex off table 60 x 2 bilat    Incline/decline squat X 30              crossover 60 x 2    piriformis 60 x 2    Manual Intervention (53771)  Min:     Knee mobs/PROM kineseo tape    Tib/Fem Mobs     Patella Mobs 5 min    Ankle mobs     K Tape Runner's knee to Right    STM Quad,TFL,gastroc, gluts, piriformis, concentrated on itb, vastus lateralis, biceps formalis x 30 min    NMR re-education (71973)  Min:  CUES NEEDED wobble X 2 min ea    sls X 2 min    Step ups 6\" x30, step tap 4\" x 30                   Therapeutic Activity (51610)  Min:               Modalities  Min:     IFC with      CP after exercises     MH after exercises                                        HEP     gastroc stretch 60 x 2    Hams stretch 60 x 2    qs X 20    Prone flex X 30    slr X 30           Other Therapeutic Activities:   Pt was educated on PT POC, Diagnosis, Prognosis, pathomechanics as well as frequency and duration of scheduling future physical therapy appointments. Time was also taken on this day to answer all patient questions and participation in PT. Reviewed appointment policy in detail with patient and patient verbalized understanding. Home Exercise Program:   Patient was instructed in the following for HEP:     . Patient verbalized/demonstrated understanding and was issued written handout. 12/22/20  HEP  -clams x 30,       Therapeutic Exercise and NMR EXR  [] (51601) Provided verbal/tactile cueing for activities related to strengthening, flexibility, endurance, ROM for improvements in LE, proximal hip, and core control with self care, mobility, lifting, ambulation.  [] (52467) Provided verbal/tactile cueing for activities related to improving balance, coordination, kinesthetic sense, posture, motor skill, proprioception  to assist with LE, proximal hip, and core control in self care, mobility, lifting, ambulation and eccentric single leg control.      NMR and Therapeutic Activities:    [] (22203 or 95128) Provided verbal/tactile cueing for activities related to improving balance, coordination, kinesthetic sense, posture, motor skill, proprioception and motor activation to allow for proper function of core, proximal hip and LE with self care and ADLs and functional mobility.   [] (29870) Gait Re-education- Provided training and instruction to the patient for proper LE, core and proximal hip recruitment and positioning and eccentric body weight control with ambulation re-education including up and down stairs     Home Exercise Program:    [x] (28204) Reviewed/Progressed HEP activities related to strengthening, flexibility, endurance, ROM of core, proximal hip and LE for functional self-care, mobility, lifting and ambulation/stair navigation   [] (23334)Reviewed/Progressed HEP activities related to improving balance, coordination, kinesthetic sense, posture, motor skill, proprioception of core, proximal hip and LE for self care, mobility, lifting, and ambulation/stair navigation      Manual Treatments:  PROM / STM / Oscillations-Mobs:  G-I, II, III, IV (PA's, Inf., Post.)  [] (21404) Provided manual therapy to mobilize LE, proximal hip and/or LS spine soft tissue/joints for the purpose of modulating pain, promoting relaxation,  increasing ROM, reducing/eliminating soft tissue swelling/inflammation/restriction, improving soft tissue extensibility and allowing for proper ROM for normal function with self care, mobility, lifting and ambulation. Charges:  Timed Code Treatment Minutes: 45   Total Treatment Minutes: 45      [] EVAL (LOW) 71890 (typically 20 minutes face-to-face)  [] EVAL (MOD) 78482 (typically 30 minutes face-to-face)  [] EVAL (HIGH) 37393 (typically 45 minutes face-to-face)  [] RE-EVAL     [x] VC(14692) x   1  [] Dry needle 1 or 2 Muscles (21973)  [x] NMR (38825) x   1  [] Dry needle 3+ Muscles (41508)  [x] Manual (83687) x 1    [] Ultrasound (60647) x1  [] TA (49335) x     [] Mech Traction (58219)  [] ES(attended) (02320)     [] ES (un) (77259):   [] Vasopump (38684) [] Ionto (67956)   [] Other:    Porfirio Davis stated goal: \" I want to be able to walk and function like normal \"  [x]? Progressing: []? Met: []? Not Met: []? Adjusted     Therapist goals for Patient:   Short Term Goals: To be achieved in: 2 weeks  1. Independent in HEP and progression per patient tolerance, in order to prevent re-injury. []? Progressing: [x]?  Met: []? Not Met: []? Adjusted  2. Patient will have a decrease in pain to facilitate improvement in movement, function, and ADLs as indicated by Functional Deficits. []? Progressing: [x]? Met: []? Not Met: []? Adjusted     Long Term Goals: To be achieved in:6weeks     [x]? Progressing: []? Met: []? Not Met: []? Adjusted  2. Patient will demonstrate increased AROM to 120-0 to allow for proper joint functioning as indicated by patients Functional Deficits. [x]? Progressing: []? Met: []? Not Met: []? Adjusted  3. Patient will demonstrate an increase in Strength to good proximal hip strength and control, within 5lb HHD in LE to allow for proper functional mobility as indicated by patients Functional Deficits. [x]? Progressing: []? Met: []? Not Met: []? Adjusted  4. Patient will return to 75%functional activities without increased symptoms or restriction. [x]? Progressing: []? Met: []? Not Met: []? Adjusted  5. (patient specific functional goal)    [x]? Progressing: []? Met: []? Not Met: []? Adjusted     S:    ASSESSMENT:  See eval    Treatment/Activity Tolerance:  [x] Patient tolerated treatment well [] Patient limited by fatique  [] Patient limited by pain  [] Patient limited by other medical complications  [] Other:     Overall Progression Towards Functional goals/ Treatment Progress Update:  [] Patient is progressing as expected towards functional goals listed. [] Progression is slowed due to complexities/Impairments listed. [] Progression has been slowed due to co-morbidities. [x] Plan just implemented, too soon to assess goals progression <30days   [] Goals require adjustment due to lack of progress  [] Patient is not progressing as expected and requires additional follow up with physician  [] Other    Prognosis for POC: [x] Good [] Fair  [] Poor    Patient requires continued skilled intervention: [x] Yes  [] No        PLAN: LE arom, prom  strength, proprioception, gait, balance, functional activities.   Mfr, joint mobs, mods as needed, hep. Progress as tolerated, Will consider dry needling if ok with md  Continue to work on lateral muscles and hams and well as strength. Consider dry needling if ok with md    [] Continue per plan of care [] Alter current plan (see comments)  [x] Plan of care initiated [] Hold pending MD visit [] Discharge    Electronically signed by: Catalina Poole PT    Note: If patient does not return for scheduled/recommended follow up visits, this note will serve as a discharge from care along with the most recent update on progress.

## 2021-02-05 ENCOUNTER — HOSPITAL ENCOUNTER (OUTPATIENT)
Dept: PHYSICAL THERAPY | Age: 61
Setting detail: THERAPIES SERIES
Discharge: HOME | End: 2021-02-05
Payer: MEDICARE

## 2021-02-05 PROCEDURE — 20561 NDL INSJ W/O NJX 3+ MUSC: CPT

## 2021-02-05 PROCEDURE — 97110 THERAPEUTIC EXERCISES: CPT

## 2021-02-05 PROCEDURE — 97140 MANUAL THERAPY 1/> REGIONS: CPT

## 2021-02-05 NOTE — FLOWSHEET NOTE
[facility] - Outpatient Rehabilitation & Therapy  [address] [place] [address]  Phone: [phone]   Fax:     [phone]      Physical Therapy Treatment Note/ Progress Report:     Date:  2021    Patient Name:  Joseph Montejo    :  1960  MRN: [id number]  Patient Name:  Joseph Montejo                        :  1960                   MRN: [id number]     Pertinent Medical History:Additional Pertinent Hx: none noted     Medical/Treatment Diagnosis Information:  · Diagnosis: M25.561 (ICD-10-CM) - Right knee pain  ·  decreased abilty to ambulate and function     Insurance/Certification information:  PT Insurance Information: [place] advantage  Physician Information:  Referring Practitioner: Dr. Juan Antonio Hernandez of care signed (Y/N): faxed     Date of Patient follow up with Physician:      Progress Report:      []? Yes                        [x]? No       Date of Patient follow up with Physician:      Progress Report: []  Yes  [x]  No     Date Range for reporting period:  Beginnin2021  Ending:      Progress report due (10 Rx/or 30 days whichever is less):     Recertification due (POC duration/ or 90 days whichever is less): 3/22/21     Visit # POC/Insurance Allowable Auth Needed    20 []Yes   []No     Latex Allergy:  [x]NO      []YES  Preferred Language for Healthcare:   [x]English       []Other:    Functional Scale:      Date assessed: at eval  Test: LEFS-18  Score:    Pain level:2/10     History of Injury:Patient is a 65 y/o female with a long hx of right knee pain for a fewyears with an increase over the past few months. She c/o constant aching pain in her right knee which increases with steps, prolonged walking, and hills. She is unemployed at this time. She has had a cortisone shot which did not help at all. Minda Burleson She wakes from pain. She hopes to return to working out.   She says it gives on her on occasion    SUBJECTIVE:    Pt states, \" I want to walk and function like normal \"  12/29/20: States today she is hurting more, yesterday was a better day 5/10 pain  12/31/20: Patient reports knee is not too bad today,just some swelling around the knee.   1/5/20  Pt states, \" MRI says that I have a tear in my medial meniscus \" \" I see the md in 2 weeks to discuss surgery \"  1/19/21  Pt states, \" I haven't  done anything due to the fact that my son was killed a few weeks ago \" \"  1/21/21 Pt states, \" Knee is a little better then it was yesterday \"  1/26/21  Pt states, \" did better after last rx for a day or 2, sore from  the weather today \"  2/3/21  Pt states, \" knee feeling better \" \" Able to go up and down the steps better \"  2/5/21  Pt states, \" doing ok     OBJECTIVE:     ROM LEFT RIGHT   HIP Flex   90   HIP Abd   25   HIP Ext   5   HIP IR   30   HIP ER   20   Knee ext   -10   Knee Flex   110   Ankle PF   25   Ankle DF   5   Ankle In       Ankle Ev       Strength  LEFT RIGHT   HIP Flexors   4   HIP Abductors   4   HIP Ext   4   Hip ER   4   Knee EXT (quad)   4-   Knee Flex (HS)   4-   Ankle DF   4   Ankle PF       Ankle Inv       Ankle EV               Circumference  Mid apex  7 cm prox         40             RESTRICTIONS/PRECAUTIONS: [facility] cycst    Exercises/Interventions:     Therapeutic Ex (72590)   Min: Reps/Resistance Notes/CUES   Nu step X 5 min , level 3    Leg press 90# 2 x 20  45# 2 x 20 R    Saq/laq           Prone curls 5# x 30    Prone le ext X 30    incline 3 x 30\" R    Med hams stretch 3 x 30\" R     SLR             Hip flex off table 60 x 2 bilat    Incline/decline squat X 30              crossover 60 x 2    piriformis 60 x 2    Manual Intervention (04558)  Min:     Knee mobs/PROM kineseo tape    Tib/Fem Mobs     Patella Mobs 5 min    Ankle mobs     K Tape Runner's knee to Right    STM Quad,TFL,gastroc, gluts, piriformis, concentrated on itb, vastus lateralis, biceps formalis x 15 min    NMR proximal hip, and core control with self care, mobility, lifting, ambulation.  [] (10101) Provided verbal/tactile cueing for activities related to improving balance, coordination, kinesthetic sense, posture, motor skill, proprioception  to assist with LE, proximal hip, and core control in self care, mobility, lifting, ambulation and eccentric single leg control. NMR and Therapeutic Activities:    [] (62788 or 14279) Provided verbal/tactile cueing for activities related to improving balance, coordination, kinesthetic sense, posture, motor skill, proprioception and motor activation to allow for proper function of core, proximal hip and LE with self care and ADLs and functional mobility.   [] (03332) Gait Re-education- Provided training and instruction to the patient for proper LE, core and proximal hip recruitment and positioning and eccentric body weight control with ambulation re-education including up and down stairs     Home Exercise Program:    [x] (79290) Reviewed/Progressed HEP activities related to strengthening, flexibility, endurance, ROM of core, proximal hip and LE for functional self-care, mobility, lifting and ambulation/stair navigation   [] (34976)Reviewed/Progressed HEP activities related to improving balance, coordination, kinesthetic sense, posture, motor skill, proprioception of core, proximal hip and LE for self care, mobility, lifting, and ambulation/stair navigation      Manual Treatments:  PROM / STM / Oscillations-Mobs:  G-I, II, III, IV (PA's, Inf., Post.)  [] (22868) Provided manual therapy to mobilize LE, proximal hip and/or LS spine soft tissue/joints for the purpose of modulating pain, promoting relaxation,  increasing ROM, reducing/eliminating soft tissue swelling/inflammation/restriction, improving soft tissue extensibility and allowing for proper ROM for normal function with self care, mobility, lifting and ambulation.        Charges:  Timed Code Treatment Minutes: 45   Total Treatment Minutes: 45      [] EVAL (LOW) 69362 (typically 20 minutes face-to-face)  [] EVAL (MOD) 26617 (typically 30 minutes face-to-face)  [] EVAL (HIGH) 88074 (typically 45 minutes face-to-face)  [] RE-EVAL     [x] UE(40334) x   1  [] Dry needle 1 or 2 Muscles (31917)  [] NMR (78310) x   1  [x] Dry needle 3+ Muscles (24462)  [x] Manual (95294) x 1    [] Ultrasound (12655) x1  [] TA (29492) x     [] Mech Traction (87237)  [] ES(attended) (87546)     [] ES (un) (77196):   [] Vasopump (46187) [] Ionto (61004)   [] Other:    Geovani Ríosziyad stated goal: \" I want to be able to walk and function like normal \"  [x]? Progressing: []? Met: []? Not Met: []? Adjusted     Therapist goals for Patient:   Short Term Goals: To be achieved in: 2 weeks  1. Independent in HEP and progression per patient tolerance, in order to prevent re-injury. []? Progressing: [x]? Met: []? Not Met: []? Adjusted  2. Patient will have a decrease in pain to facilitate improvement in movement, function, and ADLs as indicated by Functional Deficits. []? Progressing: [x]? Met: []? Not Met: []? Adjusted     Long Term Goals: To be achieved in:6weeks     [x]? Progressing: []? Met: []? Not Met: []? Adjusted  2. Patient will demonstrate increased AROM to 120-0 to allow for proper joint functioning as indicated by patients Functional Deficits. [x]? Progressing: []? Met: []? Not Met: []? Adjusted  3. Patient will demonstrate an increase in Strength to good proximal hip strength and control, within 5lb HHD in LE to allow for proper functional mobility as indicated by patients Functional Deficits. [x]? Progressing: []? Met: []? Not Met: []? Adjusted  4. Patient will return to 75%functional activities without increased symptoms or restriction. [x]? Progressing: []? Met: []? Not Met: []? Adjusted  5. (patient specific functional goal)    [x]? Progressing: []? Met: []? Not Met: []?  Adjusted     S:    ASSESSMENT:  See eval    Treatment/Activity Tolerance:  [x] Patient tolerated treatment well [] Patient limited by fatique  [] Patient limited by pain  [] Patient limited by other medical complications  [] Other:     Overall Progression Towards Functional goals/ Treatment Progress Update:  [] Patient is progressing as expected towards functional goals listed. [] Progression is slowed due to complexities/Impairments listed. [] Progression has been slowed due to co-morbidities. [x] Plan just implemented, too soon to assess goals progression <30days   [] Goals require adjustment due to lack of progress  [] Patient is not progressing as expected and requires additional follow up with physician  [] Other    Prognosis for POC: [x] Good [] Fair  [] Poor    Patient requires continued skilled intervention: [x] Yes  [] No        PLAN: LE arom, prom  strength, proprioception, gait, balance, functional activities. Mfr, joint mobs, mods as needed, hep. Progress as tolerated, Will consider dry needling if ok with md  Continue to work on lateral muscles and hams and well as strength. Consider dry needling if ok with md, assess dn next rx continue if it helped    [] Continue per plan of care [] Alter current plan (see comments)  [x] Plan of care initiated [] Hold pending MD visit [] Discharge    Electronically signed by: Nneka Marks PT    Note: If patient does not return for scheduled/recommended follow up visits, this note will serve as a discharge from care along with the most recent update on progress.

## 2021-02-09 ENCOUNTER — HOSPITAL ENCOUNTER (OUTPATIENT)
Dept: PHYSICAL THERAPY | Age: 61
Setting detail: THERAPIES SERIES
End: 2021-02-09
Payer: MEDICARE

## 2021-03-30 NOTE — PLAN OF CARE
[facility] [place] [address] [address]  Phone: [phone]   Fax:     [phone]                                                     Physical Therapy Certification    Dear  ,    We had the pleasure of evaluating the following patient for physical therapy services at [facility]. A summary of our findings can be found in the initial assessment below. This includes our plan of care. If you have any questions or concerns regarding these findings, please do not hesitate to contact me at the office phone number checked above. Thank you for the referral.       Physician Signature:_______________________________Date:__________________  By signing above (or electronic signature), therapists plan is approved by physician    .  [facility] [address] [address]  Phone: [phone]   Fax:     [phone]    Physical Therapy Discharge Summary    Dear  ,    We had the pleasure of treating the following patient for physical therapy services at [address] [place] [address]. A summary of our findings can be found in the discharge summary below. This includes our final assessment. If you have any questions or concerns regarding these findings, please do not hesitate to contact me at the office phone number checked above.   Thank you for the referral.       Physician Signature:________________________________Date:__________________  By signing above, therapists plan is approved by physician        Patient: Avila Tellez   : 1960   MRN: [id number]  Referring Physician:        Evaluation Date: 3/30/2021        Medical Diagnosis Information:  ·   m25.561  ·   decreased abilty to ambulate and function    Insurance/Certification information:       Date Range of Treatment:-21  Total visits:9     Functional Outcomes Measure: at discharge  lefs-65    SUBJECTIVE:       Pain Scale:0-2/10    Type: []Constant   [x]Intermitment  []Radiating []Localized  []other:     Functional Limitations: []Sitting []Standing []Walking    []Squatting []Stairs            []ADL's  []Driving [x]Sports/Recreation  []Other:      OBJECTIVErom is wfl, strength-5-/5             Response to Treatment:   [x]Patient met all goals and has responded well to treatment and will continue HEP   []Patient should continue to improve in reasonable time if they continue HEP   []Patient has plateaued and is no longer responding to skilled PT intervention    []Patient is getting worse and would benefit from return to referring MD   []Patient unable to adhere to initial POC      GOALS: see below     PLAN:   .  Patient will need to maintain their HEP in order to prevent re-occurrence. Reason for Discharge:  [x] Goals Met   [] Patient did not return after initial evaluation   [] Progress Plateaued  [] Missed _____ scheduled appointments   [] No insurance coverage [] Patient terminated therapy   [] MD discharged from PT [] Financial Limitations  [] Other:      Electronically signed by:  Reyna Moya PT          Patient: Pablito Ellis   : 1960   MRN: [id number]  Referring Physician:        Evaluation Date: 3/30/2021      Medical Diagnosis Information:                                                Insurance information:       Precautions/ Contra-indications: non  Latex Allergy:  [x]NO      []YES  Preferred Language for Healthcare:   [x]English       []other:    C-SSRS Triggered by Intake questionnaire (Past 2 wk assessment ):   [x] No, Questionnaire did not trigger screening.   [] Yes, Patient intake triggered C-SSRS Screening      [] C-SSRS Screening completed  [] PCP notified via Epic     SUBJECTIVE: Patient is a 63 y/o female with a long hx of right knee pain for a fewyears with an increase over the past few months.   She c/o constant aching pain in her right knee which increases with steps, prolonged walking, and hills. She is unemployed at this time. She has had a cortisone shot which did not help at all. Devonte Lawrence She wakes from pain. She hopes to return to working out. She says it gives on her on occasion. Relevant Medical History:   Functional Outcome Measure: LEFS = 18    Pain Scale: 7/10  Easing factors: rest  Provocative factors: walking     Type: [x]Constant   []Intermittent  []Radiating []Localized []other:         Occupation/School: not working    Living Status/Prior Level of Function: Independent with ADLs and IADLs,     OBJECTIVE:     Posture: ant tilt pelvis, poor ns      Palpation: - tight and tender in ant tib, post tib, gastroc, add, semi tendinosis, pes, decreased patellar mob chris med and inf        Gait: - ambualtes with decreased heel strike stance, and push. Mild limp. ROM LEFT RIGHT   HIP Flex  90   HIP Abd  25   HIP Ext  5   HIP IR  30   HIP ER  20   Knee ext  -10   Knee Flex  110   Ankle PF  25   Ankle DF  5   Ankle In     Ankle Ev     Strength  LEFT RIGHT   HIP Flexors  4   HIP Abductors  4   HIP Ext  4   Hip ER  4   Knee EXT (quad)  4-   Knee Flex (HS)  4-   Ankle DF  4   Ankle PF     Ankle Inv     Ankle EV          Circumference  Mid apex  7 cm prox      40       Reflexes/Sensation:    [x]Dermatomes/Myotomes intact    [x]Reflexes equal and normal bilaterally   []Other:    Joint mobility:    []Normal    [x]Hypo   []Hyper    Orthopedic Special Tests: - Mcmurrays and Apleys neg, patellar compression painful. Ant and post drawer- neg, Collateral- med laxity noted. [x] Patient history, allergies, meds reviewed. Medical chart reviewed. See intake form. Review Of Systems (ROS):  [x]Performed Review of systems (Integumentary, CardioPulmonary, Neurological) by intake and observation. Intake form has been scanned into medical record.  Patient has been instructed to contact their primary care physician regarding ROS issues if not already being addressed at this time. Co-morbidities/Complexities (which will affect course of rehabilitation):   []None           Arthritic conditions   []Rheumatoid arthritis (M05.9)  [x]Osteoarthritis (M19.91)   Cardiovascular conditions   []Hypertension (I10)  []Hyperlipidemia (E78.5)  []Angina pectoris (I20)  []Atherosclerosis (I70)  []CVA Musculoskeletal conditions   []Disc pathology   []Congenital spine pathologies   []Prior surgical intervention  []Osteoporosis (M81.8)  []Osteopenia (M85.8)   Endocrine conditions   []Hypothyroid (E03.9)  []Hyperthyroid Gastrointestinal conditions   []Constipation (L98.20)   Metabolic conditions   []Morbid obesity (E66.01)  []Diabetes type 1(E10.65) or 2 (E11.65)   []Neuropathy (G60.9)     Pulmonary conditions   []Asthma (J45)  []Coughing   []COPD (J44.9)   Psychological Disorders  []Anxiety (F41.9)  []Depression (F32.9)   []Other:   [x]Other:   fibromyalgia       Barriers to/and or personal factors that will affect rehab potential:              []Age  []Sex    [x]Smoker              []Motivation/Lack of Motivation                        [x]Co-Morbidities              []Cognitive Function, education/learning barriers              []Environmental, home barriers              []profession/work barriers  []past PT/medical experience  []other:  Justification:     Falls Risk Assessment (30 days):  [x] Falls Risk assessed and no intervention required.   [] Falls Risk assessed and Patient requires intervention due to being higher risk   TUG score (>12s at risk):     [] Falls education provided, including         ASSESSMENT:   Functional Impairments:     []Noted lumbar/proximal hip/LE hypomobility   [x]Decreased LE functional ROM   [x]Decreased core/proximal hip strength and neuromuscular control   [x]Decreased LE functional strength   [x]Reduced balance/proprioceptive control   []other:      Functional Activity Limitations (from functional questionnaire and intake)   []Reduced ability to tolerate prolonged functional positions   []Reduced ability or difficulty with changes of positions or transfers between positions   []Reduced ability to maintain good posture and demonstrate good body mechanics with sitting, bending, and lifting   [x]Reduced ability to sleep   [] Reduced ability or tolerance with driving and/or computer work   [x]Reduced ability to perform lifting, carrying tasks   [x]Reduced ability to squat   []Reduced ability to forward bend   [x]Reduced ability to ambulate prolonged functional periods/distances/surfaces   [x]Reduced ability to ascend/descend stairs   [x]Reduced ability to run, hop or jump   []other:     Participation Restrictions   [x]Reduced participation in self care activities   [x]Reduced participation in home management activities   [x]Reduced participation in work activities   [x]Reduced participation in social activities. [x]Reduced participation in sport activities. Classification :    [x]Signs/symptoms consistent with post-surgical status including decreased ROM, strength and function.    []Signs/symptoms consistent with joint sprain/strain   [x]Signs/symptoms consistent with patella-femoral syndrome   []Signs/symptoms consistent with knee OA/hip OA   [x]Signs/symptoms consistent with internal derangement of knee/Hip   [x]Signs/symptoms consistent with functional hip weakness/NMR control      [x]Signs/symptoms consistent with tendinitis/tendinosis    [x]signs/symptoms consistent with pathology which may benefit from Dry needling      []other:      Prognosis/Rehab Potential:      []Excellent   [x]Good    [x]Fair   []Poor    Tolerance of evaluation/treatment:    []Excellent   [x]Good    [x]Fair   []Poor    Physical Therapy Evaluation Complexity Justification  [x] A history of present problem with:  [] no personal factors and/or comorbidities that impact the plan of care;  []1-2 personal factors and/or comorbidities that impact the plan of care  []3 personal factors and/or comorbidities that impact the plan of care  [x] An examination of body systems using standardized tests and measures addressing any of the following: body structures and functions (impairments), activity limitations, and/or participation restrictions;:  [] a total of 1-2 or more elements   [] a total of 3 or more elements   [] a total of 4 or more elements   [x] A clinical presentation with:  [] stable and/or uncomplicated characteristics   [] evolving clinical presentation with changing characteristics  [] unstable and unpredictable characteristics;   [x] Clinical decision making of [] low, [] moderate, [] high complexity using standardized patient assessment instrument and/or measurable assessment of functional outcome. [x] EVAL (LOW) 82658 (typically 20 minutes face-to-face)  [] EVAL (MOD) 87912 (typically 30 minutes face-to-face)  [] EVAL (HIGH) 16293 (typically 45 minutes face-to-face)  [] RE-EVAL     PLAN:  Frequency/Duration:  1-2 days per week for 4-6 Weeks:  Interventions:  [x]  Therapeutic exercise including: strength training, ROM, for Lower extremity and core   [x]  NMR activation and proprioception for LE, Glutes and Core   [x]  Manual therapy as indicated for LE, Hip and spine to include: Dry Needling/IASTM, STM, PROM, Gr I-IV mobilizations, manipulation. [x] Modalities as needed that may include: thermal agents, E-stim, Biofeedback, US, iontophoresis as indicated  [x] Patient education on joint protection, postural re-education, activity modification, progression of HEP. (see scanned forms)    GOALS:  Patient stated goal: \" I want to be able to walk and function like normal \"  [] Progressing: [] Met: [] Not Met: [] Adjusted    Therapist goals for Patient:   Short Term Goals: To be achieved in: 2 weeks  1. Independent in HEP and progression per patient tolerance, in order to prevent re-injury.    [] Progressing: [x] Met: [] Not Met: [] Adjusted  2. Patient will have a decrease in pain to facilitate improvement in movement, function, and ADLs as indicated by Functional Deficits. [] Progressing: [x] Met: [] Not Met: [] Adjusted    Long Term Goals: To be achieved in:6weeks    [] Progressing: [x] Met: [] Not Met: [] Adjusted  2. Patient will demonstrate increased AROM to 120-0 to allow for proper joint functioning as indicated by patients Functional Deficits. [] Progressing: [x] Met: [] Not Met: [] Adjusted  3. Patient will demonstrate an increase in Strength to good proximal hip strength and control, within 5lb HHD in LE to allow for proper functional mobility as indicated by patients Functional Deficits. [] Progressing: [x] Met: [] Not Met: [] Adjusted  4. Patient will return to 75%functional activities without increased symptoms or restriction. [] Progressing: [x] Met: [] Not Met: [] Adjusted  5. (patient specific functional goal)    [] Progressing: [x] Met: [] Not Met: [] Adjusted     Electronically signed by:  Cosme Sánchez PT      Note: If patient does not return for scheduled/recommended follow up visits, this note will serve as a discharge from care along with the most recent update on progress.